# Patient Record
Sex: FEMALE | Race: WHITE | NOT HISPANIC OR LATINO | Employment: UNEMPLOYED | URBAN - NONMETROPOLITAN AREA
[De-identification: names, ages, dates, MRNs, and addresses within clinical notes are randomized per-mention and may not be internally consistent; named-entity substitution may affect disease eponyms.]

---

## 2017-06-11 ENCOUNTER — APPOINTMENT (OUTPATIENT)
Dept: GENERAL RADIOLOGY | Facility: HOSPITAL | Age: 57
End: 2017-06-11

## 2017-06-11 ENCOUNTER — HOSPITAL ENCOUNTER (EMERGENCY)
Facility: HOSPITAL | Age: 57
Discharge: HOME | End: 2017-06-11
Attending: EMERGENCY MEDICINE | Admitting: EMERGENCY MEDICINE

## 2017-06-11 VITALS
HEART RATE: 97 BPM | TEMPERATURE: 98.3 F | BODY MASS INDEX: 28 KG/M2 | OXYGEN SATURATION: 96 % | SYSTOLIC BLOOD PRESSURE: 131 MMHG | RESPIRATION RATE: 26 BRPM | HEIGHT: 63 IN | DIASTOLIC BLOOD PRESSURE: 74 MMHG | WEIGHT: 158 LBS

## 2017-06-11 DIAGNOSIS — J44.1 COPD EXACERBATION (HCC): Primary | ICD-10-CM

## 2017-06-11 LAB
A-A DO2: 41.7 MMHG (ref 0–300)
ALBUMIN SERPL-MCNC: 4.3 G/DL (ref 3.5–5)
ALBUMIN/GLOB SERPL: 1.2 G/DL (ref 1.5–2.5)
ALP SERPL-CCNC: 104 U/L (ref 35–104)
ALT SERPL W P-5'-P-CCNC: 20 U/L (ref 10–36)
ANION GAP SERPL CALCULATED.3IONS-SCNC: 6.6 MMOL/L (ref 3.6–11.2)
ARTERIAL PATENCY WRIST A: ABNORMAL
AST SERPL-CCNC: 20 U/L (ref 10–30)
ATMOSPHERIC PRESS: 731 MMHG
BASE EXCESS BLDA CALC-SCNC: 1 MMOL/L
BASOPHILS # BLD AUTO: 0.08 10*3/MM3 (ref 0–0.3)
BASOPHILS NFR BLD AUTO: 0.6 % (ref 0–2)
BDY SITE: ABNORMAL
BILIRUB SERPL-MCNC: 0.2 MG/DL (ref 0.2–1.8)
BILIRUB UR QL STRIP: NEGATIVE
BNP SERPL-MCNC: 30 PG/ML (ref 0–100)
BODY TEMPERATURE: 98.6 C
BUN BLD-MCNC: 12 MG/DL (ref 7–21)
BUN/CREAT SERPL: 15.8 (ref 7–25)
CALCIUM SPEC-SCNC: 9.7 MG/DL (ref 7.7–10)
CHLORIDE SERPL-SCNC: 108 MMOL/L (ref 99–112)
CLARITY UR: CLEAR
CO2 SERPL-SCNC: 26.4 MMOL/L (ref 24.3–31.9)
COHGB MFR BLD: 3.5 % (ref 0–5)
COLOR UR: YELLOW
CREAT BLD-MCNC: 0.76 MG/DL (ref 0.43–1.29)
D-LACTATE SERPL-SCNC: 1.2 MMOL/L (ref 0.5–2)
DEPRECATED RDW RBC AUTO: 41.7 FL (ref 37–54)
EOSINOPHIL # BLD AUTO: 0.09 10*3/MM3 (ref 0–0.7)
EOSINOPHIL NFR BLD AUTO: 0.7 % (ref 0–5)
ERYTHROCYTE [DISTWIDTH] IN BLOOD BY AUTOMATED COUNT: 12.9 % (ref 11.5–14.5)
FLUAV AG NPH QL: NEGATIVE
FLUBV AG NPH QL IA: NEGATIVE
GFR SERPL CREATININE-BSD FRML MDRD: 78 ML/MIN/1.73
GLOBULIN UR ELPH-MCNC: 3.6 GM/DL
GLUCOSE BLD-MCNC: 118 MG/DL (ref 70–110)
GLUCOSE UR STRIP-MCNC: NEGATIVE MG/DL
HCO3 BLDA-SCNC: 23.3 MMOL/L (ref 22–26)
HCT VFR BLD AUTO: 40 % (ref 37–47)
HCT VFR BLD CALC: 41 % (ref 37–47)
HGB BLD-MCNC: 13.2 G/DL (ref 12–16)
HGB BLDA-MCNC: 13.8 G/DL (ref 12–16)
HGB UR QL STRIP.AUTO: NEGATIVE
HOLD SPECIMEN: NORMAL
HOLD SPECIMEN: NORMAL
HOROWITZ INDEX BLD+IHG-RTO: 21 %
IMM GRANULOCYTES # BLD: 0.04 10*3/MM3 (ref 0–0.03)
IMM GRANULOCYTES NFR BLD: 0.3 % (ref 0–0.5)
KETONES UR QL STRIP: ABNORMAL
LEUKOCYTE ESTERASE UR QL STRIP.AUTO: NEGATIVE
LYMPHOCYTES # BLD AUTO: 3.32 10*3/MM3 (ref 1–3)
LYMPHOCYTES NFR BLD AUTO: 25.2 % (ref 21–51)
MCH RBC QN AUTO: 29.6 PG (ref 27–33)
MCHC RBC AUTO-ENTMCNC: 33 G/DL (ref 33–37)
MCV RBC AUTO: 89.7 FL (ref 80–94)
METHGB BLD QL: 0.2 % (ref 0–3)
MODALITY: ABNORMAL
MONOCYTES # BLD AUTO: 0.78 10*3/MM3 (ref 0.1–0.9)
MONOCYTES NFR BLD AUTO: 5.9 % (ref 0–10)
NEUTROPHILS # BLD AUTO: 8.89 10*3/MM3 (ref 1.4–6.5)
NEUTROPHILS NFR BLD AUTO: 67.3 % (ref 30–70)
NITRITE UR QL STRIP: NEGATIVE
OSMOLALITY SERPL CALC.SUM OF ELEC: 282.1 MOSM/KG (ref 273–305)
OXYHGB MFR BLDV: 91.2 % (ref 85–100)
PCO2 BLDA: 30.6 MM HG (ref 35–45)
PH BLDA: 7.5 PH UNITS (ref 7.35–7.45)
PH UR STRIP.AUTO: 7 [PH] (ref 5–8)
PLATELET # BLD AUTO: 469 10*3/MM3 (ref 130–400)
PMV BLD AUTO: 10.2 FL (ref 6–10)
PO2 BLDA: 65.3 MM HG (ref 80–100)
POTASSIUM BLD-SCNC: 3.4 MMOL/L (ref 3.5–5.3)
PROT SERPL-MCNC: 7.9 G/DL (ref 6–8)
PROT UR QL STRIP: NEGATIVE
RBC # BLD AUTO: 4.46 10*6/MM3 (ref 4.2–5.4)
SAO2 % BLDCOA: 94.7 % (ref 90–100)
SODIUM BLD-SCNC: 141 MMOL/L (ref 135–153)
SP GR UR STRIP: 1.02 (ref 1–1.03)
TROPONIN I SERPL-MCNC: <0.006 NG/ML
UROBILINOGEN UR QL STRIP: ABNORMAL
WBC NRBC COR # BLD: 13.2 10*3/MM3 (ref 4.5–12.5)
WHOLE BLOOD HOLD SPECIMEN: NORMAL
WHOLE BLOOD HOLD SPECIMEN: NORMAL

## 2017-06-11 PROCEDURE — 99284 EMERGENCY DEPT VISIT MOD MDM: CPT

## 2017-06-11 PROCEDURE — 93010 ELECTROCARDIOGRAM REPORT: CPT | Performed by: INTERNAL MEDICINE

## 2017-06-11 PROCEDURE — 87040 BLOOD CULTURE FOR BACTERIA: CPT | Performed by: EMERGENCY MEDICINE

## 2017-06-11 PROCEDURE — 36600 WITHDRAWAL OF ARTERIAL BLOOD: CPT | Performed by: PHYSICIAN ASSISTANT

## 2017-06-11 PROCEDURE — 71010 HC CHEST PA OR AP: CPT

## 2017-06-11 PROCEDURE — 93005 ELECTROCARDIOGRAM TRACING: CPT | Performed by: PHYSICIAN ASSISTANT

## 2017-06-11 PROCEDURE — 83050 HGB METHEMOGLOBIN QUAN: CPT | Performed by: PHYSICIAN ASSISTANT

## 2017-06-11 PROCEDURE — 94799 UNLISTED PULMONARY SVC/PX: CPT

## 2017-06-11 PROCEDURE — 82805 BLOOD GASES W/O2 SATURATION: CPT | Performed by: PHYSICIAN ASSISTANT

## 2017-06-11 PROCEDURE — 85025 COMPLETE CBC W/AUTO DIFF WBC: CPT | Performed by: EMERGENCY MEDICINE

## 2017-06-11 PROCEDURE — 81003 URINALYSIS AUTO W/O SCOPE: CPT | Performed by: EMERGENCY MEDICINE

## 2017-06-11 PROCEDURE — 71010 XR CHEST 1 VW: CPT | Performed by: RADIOLOGY

## 2017-06-11 PROCEDURE — 83880 ASSAY OF NATRIURETIC PEPTIDE: CPT | Performed by: PHYSICIAN ASSISTANT

## 2017-06-11 PROCEDURE — 84484 ASSAY OF TROPONIN QUANT: CPT | Performed by: PHYSICIAN ASSISTANT

## 2017-06-11 PROCEDURE — 83605 ASSAY OF LACTIC ACID: CPT | Performed by: EMERGENCY MEDICINE

## 2017-06-11 PROCEDURE — 87804 INFLUENZA ASSAY W/OPTIC: CPT | Performed by: PHYSICIAN ASSISTANT

## 2017-06-11 PROCEDURE — 82375 ASSAY CARBOXYHB QUANT: CPT | Performed by: PHYSICIAN ASSISTANT

## 2017-06-11 PROCEDURE — 80053 COMPREHEN METABOLIC PANEL: CPT | Performed by: EMERGENCY MEDICINE

## 2017-06-11 PROCEDURE — 94640 AIRWAY INHALATION TREATMENT: CPT

## 2017-06-11 RX ORDER — DOXYCYCLINE 100 MG/1
100 CAPSULE ORAL ONCE
Status: COMPLETED | OUTPATIENT
Start: 2017-06-11 | End: 2017-06-11

## 2017-06-11 RX ORDER — BUDESONIDE AND FORMOTEROL FUMARATE DIHYDRATE 160; 4.5 UG/1; UG/1
2 AEROSOL RESPIRATORY (INHALATION)
COMMUNITY
End: 2017-06-11

## 2017-06-11 RX ORDER — BUDESONIDE AND FORMOTEROL FUMARATE DIHYDRATE 160; 4.5 UG/1; UG/1
2 AEROSOL RESPIRATORY (INHALATION)
Qty: 10 G | Refills: 0 | Status: SHIPPED | OUTPATIENT
Start: 2017-06-11 | End: 2018-11-16 | Stop reason: SDUPTHER

## 2017-06-11 RX ORDER — CYCLOSPORINE 0.5 MG/ML
1 EMULSION OPHTHALMIC 2 TIMES DAILY
COMMUNITY
End: 2022-03-16

## 2017-06-11 RX ORDER — DOXYCYCLINE 100 MG/1
100 CAPSULE ORAL 2 TIMES DAILY
Qty: 20 CAPSULE | Refills: 0 | Status: SHIPPED | OUTPATIENT
Start: 2017-06-11 | End: 2018-11-16 | Stop reason: SDUPTHER

## 2017-06-11 RX ORDER — SODIUM CHLORIDE 0.9 % (FLUSH) 0.9 %
10 SYRINGE (ML) INJECTION AS NEEDED
Status: DISCONTINUED | OUTPATIENT
Start: 2017-06-11 | End: 2017-06-11 | Stop reason: HOSPADM

## 2017-06-11 RX ORDER — IPRATROPIUM BROMIDE AND ALBUTEROL SULFATE 2.5; .5 MG/3ML; MG/3ML
3 SOLUTION RESPIRATORY (INHALATION) ONCE
Status: COMPLETED | OUTPATIENT
Start: 2017-06-11 | End: 2017-06-11

## 2017-06-11 RX ADMIN — IPRATROPIUM BROMIDE AND ALBUTEROL SULFATE 3 ML: .5; 3 SOLUTION RESPIRATORY (INHALATION) at 17:19

## 2017-06-11 RX ADMIN — DOXYCYCLINE 100 MG: 100 CAPSULE ORAL at 19:30

## 2017-06-11 NOTE — DISCHARGE INSTRUCTIONS

## 2017-06-11 NOTE — ED PROVIDER NOTES
Subjective   Patient is a 57 y.o. female presenting with cough.   Cough   Cough characteristics:  Productive  Sputum characteristics:  Green  Severity:  Moderate  Onset quality:  Gradual  Duration:  3 days  Timing:  Constant  Chronicity:  Recurrent  Smoker: yes    Relieved by:  Nothing  Worsened by:  Nothing  Associated symptoms: fever, shortness of breath and wheezing    Associated symptoms: no chest pain        Review of Systems   Constitutional: Positive for fever.   HENT: Negative.    Respiratory: Positive for cough, shortness of breath and wheezing.    Cardiovascular: Negative.  Negative for chest pain.   Gastrointestinal: Negative.  Negative for abdominal pain.   Endocrine: Negative.    Genitourinary: Negative.  Negative for dysuria.   Skin: Negative.    Neurological: Negative.    Psychiatric/Behavioral: Negative.    All other systems reviewed and are negative.      Past Medical History:   Diagnosis Date   • COPD (chronic obstructive pulmonary disease)    • Glaucoma        Allergies   Allergen Reactions   • Penicillins    • Sulfa Antibiotics        Past Surgical History:   Procedure Laterality Date   • CHOLECYSTECTOMY     • EYE SURGERY     • TONSILLECTOMY         History reviewed. No pertinent family history.    Social History     Social History   • Marital status:      Spouse name: N/A   • Number of children: N/A   • Years of education: N/A     Social History Main Topics   • Smoking status: Current Every Day Smoker     Packs/day: 0.50     Types: Cigarettes   • Smokeless tobacco: None   • Alcohol use No   • Drug use: No   • Sexual activity: Defer     Other Topics Concern   • None     Social History Narrative   • None           Objective   Physical Exam   Constitutional: She is oriented to person, place, and time. She appears well-developed and well-nourished. No distress.   HENT:   Head: Normocephalic and atraumatic.   Right Ear: External ear normal.   Left Ear: External ear normal.   Nose: Nose  normal.   Eyes: Conjunctivae and EOM are normal. Pupils are equal, round, and reactive to light.   Neck: Normal range of motion. Neck supple. No JVD present. No tracheal deviation present.   Cardiovascular: Normal rate, regular rhythm and normal heart sounds.    No murmur heard.  Pulmonary/Chest: Effort normal. No respiratory distress. She has wheezes (mild bilateral).   Abdominal: Soft. Bowel sounds are normal. There is no tenderness.   Musculoskeletal: Normal range of motion. She exhibits no edema or deformity.   Neurological: She is alert and oriented to person, place, and time. No cranial nerve deficit.   Skin: Skin is warm and dry. No rash noted. She is not diaphoretic. No erythema. No pallor.   Psychiatric: She has a normal mood and affect. Her behavior is normal. Thought content normal.   Nursing note and vitals reviewed.      Procedures         ED Course  ED Course   Comment By Time   Disposition patient was in no distress.  Her sat was normal.  She has been counseled about the need for smoking cessation along with follow-up with primary care.  She is to return with any worsening. CRAIG Kruger 06/11 1923                  MDM  Number of Diagnoses or Management Options  COPD exacerbation: established and worsening     Amount and/or Complexity of Data Reviewed  Clinical lab tests: ordered and reviewed  Tests in the radiology section of CPT®: ordered and reviewed    Risk of Complications, Morbidity, and/or Mortality  Presenting problems: moderate        Final diagnoses:   COPD exacerbation            CRAIG Kruger  06/11/17 1927

## 2017-06-11 NOTE — ED NOTES
Pt alert and oriented, skin pwd, no respiratory distress. Pt sitting up in bed, fall precautions maintained, monitoring continued.      Letitia Crisostomo RN  06/11/17 1824

## 2017-06-16 ENCOUNTER — TRANSCRIBE ORDERS (OUTPATIENT)
Dept: ADMINISTRATIVE | Facility: HOSPITAL | Age: 57
End: 2017-06-16

## 2017-06-16 DIAGNOSIS — Z12.31 VISIT FOR SCREENING MAMMOGRAM: Primary | ICD-10-CM

## 2017-06-16 LAB
BACTERIA SPEC AEROBE CULT: NORMAL
BACTERIA SPEC AEROBE CULT: NORMAL

## 2017-06-22 ENCOUNTER — HOSPITAL ENCOUNTER (OUTPATIENT)
Dept: MAMMOGRAPHY | Facility: HOSPITAL | Age: 57
Discharge: HOME | End: 2017-06-22
Attending: INTERNAL MEDICINE | Admitting: INTERNAL MEDICINE

## 2017-06-22 DIAGNOSIS — Z12.31 VISIT FOR SCREENING MAMMOGRAM: ICD-10-CM

## 2017-06-22 PROCEDURE — G0202 SCR MAMMO BI INCL CAD: HCPCS

## 2017-06-22 PROCEDURE — 77067 SCR MAMMO BI INCL CAD: CPT | Performed by: RADIOLOGY

## 2017-06-22 PROCEDURE — 77063 BREAST TOMOSYNTHESIS BI: CPT | Performed by: RADIOLOGY

## 2017-06-22 PROCEDURE — 77063 BREAST TOMOSYNTHESIS BI: CPT

## 2018-11-16 ENCOUNTER — OFFICE VISIT (OUTPATIENT)
Dept: RETAIL CLINIC | Facility: CLINIC | Age: 58
End: 2018-11-16

## 2018-11-16 VITALS
WEIGHT: 166.4 LBS | BODY MASS INDEX: 30.62 KG/M2 | OXYGEN SATURATION: 97 % | HEIGHT: 62 IN | RESPIRATION RATE: 18 BRPM | SYSTOLIC BLOOD PRESSURE: 148 MMHG | TEMPERATURE: 98.3 F | HEART RATE: 89 BPM | DIASTOLIC BLOOD PRESSURE: 88 MMHG

## 2018-11-16 DIAGNOSIS — Z76.0 MEDICATION REFILL: ICD-10-CM

## 2018-11-16 DIAGNOSIS — J40 BRONCHITIS: Primary | ICD-10-CM

## 2018-11-16 DIAGNOSIS — J01.00 SUBACUTE MAXILLARY SINUSITIS: ICD-10-CM

## 2018-11-16 DIAGNOSIS — J44.9 CHRONIC OBSTRUCTIVE PULMONARY DISEASE, UNSPECIFIED COPD TYPE (HCC): ICD-10-CM

## 2018-11-16 DIAGNOSIS — R35.0 URINARY FREQUENCY: ICD-10-CM

## 2018-11-16 LAB
BILIRUB BLD-MCNC: NEGATIVE MG/DL
CLARITY, POC: CLEAR
COLOR UR: YELLOW
GLUCOSE UR STRIP-MCNC: NEGATIVE MG/DL
KETONES UR QL: NEGATIVE
LEUKOCYTE EST, POC: NEGATIVE
NITRITE UR-MCNC: NEGATIVE MG/ML
PH UR: 5.5 [PH] (ref 5–8)
PROT UR STRIP-MCNC: NEGATIVE MG/DL
RBC # UR STRIP: NEGATIVE /UL
SP GR UR: 1.02 (ref 1–1.03)
UROBILINOGEN UR QL: NORMAL

## 2018-11-16 PROCEDURE — 81003 URINALYSIS AUTO W/O SCOPE: CPT | Performed by: NURSE PRACTITIONER

## 2018-11-16 PROCEDURE — 99204 OFFICE O/P NEW MOD 45 MIN: CPT | Performed by: NURSE PRACTITIONER

## 2018-11-16 RX ORDER — ALBUTEROL SULFATE 90 UG/1
2 AEROSOL, METERED RESPIRATORY (INHALATION) EVERY 4 HOURS PRN
COMMUNITY
End: 2018-11-16 | Stop reason: SDUPTHER

## 2018-11-16 RX ORDER — ALBUTEROL SULFATE 90 UG/1
2 AEROSOL, METERED RESPIRATORY (INHALATION) EVERY 4 HOURS PRN
Qty: 1 INHALER | Refills: 0 | Status: SHIPPED | OUTPATIENT
Start: 2018-11-16

## 2018-11-16 RX ORDER — IPRATROPIUM BROMIDE AND ALBUTEROL SULFATE 2.5; .5 MG/3ML; MG/3ML
3 SOLUTION RESPIRATORY (INHALATION) EVERY 4 HOURS PRN
COMMUNITY
End: 2022-03-16

## 2018-11-16 RX ORDER — DEXTROMETHORPHAN HYDROBROMIDE AND PROMETHAZINE HYDROCHLORIDE 15; 6.25 MG/5ML; MG/5ML
5 SYRUP ORAL EVERY 6 HOURS PRN
Qty: 100 ML | Refills: 0 | Status: SHIPPED | OUTPATIENT
Start: 2018-11-16 | End: 2018-11-21

## 2018-11-16 RX ORDER — PREDNISONE 10 MG/1
TABLET ORAL
Qty: 20 TABLET | Refills: 0 | Status: SHIPPED | OUTPATIENT
Start: 2018-11-16 | End: 2022-03-16

## 2018-11-16 RX ORDER — GLIMEPIRIDE 2 MG/1
1 TABLET ORAL 2 TIMES DAILY
COMMUNITY
End: 2022-03-16

## 2018-11-16 RX ORDER — DOXYCYCLINE 100 MG/1
100 CAPSULE ORAL EVERY 12 HOURS SCHEDULED
Qty: 20 CAPSULE | Refills: 0 | Status: SHIPPED | OUTPATIENT
Start: 2018-11-16 | End: 2022-03-16

## 2018-11-16 RX ORDER — BUDESONIDE AND FORMOTEROL FUMARATE DIHYDRATE 160; 4.5 UG/1; UG/1
2 AEROSOL RESPIRATORY (INHALATION)
Qty: 1 INHALER | Refills: 0 | Status: SHIPPED | OUTPATIENT
Start: 2018-11-16

## 2018-11-16 NOTE — PROGRESS NOTES
[email or web address]@  Mandi Baldwin is a 58 y.o. female.   Chief Complaint   Patient presents with   • Cough   • Urinary Frequency      Cough   This is a recurrent problem. The problem has been gradually worsening. The cough is productive of brown sputum. Associated symptoms include ear congestion, headaches, nasal congestion, postnasal drip, shortness of breath (mild) and wheezing (at night). Pertinent negatives include no chest pain, chills, ear pain, fever, myalgias, rash, rhinorrhea or sore throat. Nothing aggravates the symptoms. Risk factors for lung disease include smoking/tobacco exposure. She has tried OTC cough suppressant (Mucinex) for the symptoms. The treatment provided no relief. Her past medical history is significant for COPD.   Urinary Frequency    Episode onset: few days ago. The problem occurs intermittently. The problem has been unchanged. The patient is experiencing no pain. There has been no fever. Associated symptoms include frequency. Pertinent negatives include no chills, discharge, flank pain, hematuria, nausea, urgency or vomiting. She has tried nothing for the symptoms.      Presents to [facility] with c/o onset of cough with congestion and mild shortness of breath with exertion for past days. The cough onset greater than a week ago. Has a PMH of COPD. Requests refill on Symbicort and Albuterol.     The following portions of the patient's history were reviewed and updated as appropriate: allergies, current medications, past family history, past medical history, past social history, past surgical history and problem list.    Current Outpatient Medications:   •  albuterol (PROVENTIL HFA;VENTOLIN HFA) 108 (90 Base) MCG/ACT inhaler, Inhale 2 puffs Every 4 (Four) Hours As Needed for Wheezing., Disp: 1 inhaler, Rfl: 0  •  budesonide-formoterol (SYMBICORT) 160-4.5 MCG/ACT inhaler, Inhale 2 puffs 2 (Two) Times a Day., Disp: 1 inhaler, Rfl: 0  •  cycloSPORINE (RESTASIS) 0.05 % ophthalmic emulsion, 1 drop 2  (Two) Times a Day., Disp: , Rfl:   •  ipratropium-albuterol (DUO-NEB) 0.5-2.5 mg/3 ml nebulizer, Take 3 mL by nebulization Every 4 (Four) Hours As Needed for Wheezing., Disp: , Rfl:   •  timolol (TIMOPTIC) 0.25 % ophthalmic solution, 1 drop 2 (Two) Times a Day., Disp: , Rfl:   •  doxycycline (MONODOX) 100 MG capsule, Take 1 capsule by mouth Every 12 (Twelve) Hours., Disp: 20 capsule, Rfl: 0  •  predniSONE (DELTASONE) 10 MG tablet, Take 4 tablet x 2 days; 3 tablets x 2 days; 2 tablets x 2 days; 1 tablet x 2 days, Disp: 20 tablet, Rfl: 0  •  promethazine-dextromethorphan (PROMETHAZINE-DM) 6.25-15 MG/5ML syrup, Take 5 mL by mouth Every 6 (Six) Hours As Needed for Cough for up to 5 days., Disp: 100 mL, Rfl: 0  •  tiotropium (SPIRIVA) 18 MCG per inhalation capsule, Place 1 capsule into inhaler and inhale Daily., Disp: 30 capsule, Rfl: 0    Allergies   Allergen Reactions   • Sulfa Antibiotics Anaphylaxis   • Penicillins Rash     Review of Systems   Constitutional: Positive for fatigue. Negative for activity change, appetite change, chills and fever.   HENT: Positive for congestion, postnasal drip, sinus pressure and sinus pain. Negative for ear pain, rhinorrhea, sore throat and trouble swallowing.    Eyes: Negative for discharge and itching.   Respiratory: Positive for cough, shortness of breath (mild) and wheezing (at night). Negative for choking.    Cardiovascular: Negative for chest pain.   Gastrointestinal: Negative for abdominal pain, diarrhea, nausea and vomiting.   Endocrine: Negative for cold intolerance.   Genitourinary: Positive for frequency. Negative for dysuria, flank pain, hematuria, urgency and vaginal discharge.   Musculoskeletal: Negative for myalgias, neck pain and neck stiffness.   Skin: Negative for color change, pallor and rash.   Allergic/Immunologic: Negative for immunocompromised state.   Neurological: Positive for headaches. Negative for dizziness.   Psychiatric/Behavioral: Positive for sleep  "disturbance (due to cough).     Objective     Visit Vitals  /88   Pulse 89   Temp 98.3 °F (36.8 °C)   Resp 18   Ht 157.5 cm (62\")   Wt 75.5 kg (166 lb 6.4 oz)   SpO2 97%   BMI 30.43 kg/m²       Physical Exam   Constitutional: She is oriented to person, place, and time. She appears well-developed and well-nourished.   HENT:   Head: Normocephalic.   Right Ear: Tympanic membrane is bulging. Tympanic membrane is not perforated and not erythematous.   Left Ear: Tympanic membrane is injected and bulging. Tympanic membrane is not perforated and not erythematous.   Nose: Mucosal edema and rhinorrhea present. Right sinus exhibits frontal sinus tenderness. Left sinus exhibits frontal sinus tenderness.   Mouth/Throat: Mucous membranes are normal. No uvula swelling. No posterior oropharyngeal erythema or tonsillar abscesses.   Thick purulent PND   Eyes: Conjunctivae are normal. Pupils are equal, round, and reactive to light.   Neck: No JVD present.   Cardiovascular: Normal rate and regular rhythm.   Pulmonary/Chest: Effort normal. She has no decreased breath sounds. She has wheezes. She has no rhonchi. She has no rales.   Abdominal: Soft. Normal appearance and bowel sounds are normal. There is no tenderness. There is no CVA tenderness.   Musculoskeletal: Normal range of motion.   Lymphadenopathy:     She has no cervical adenopathy.   Neurological: She is alert and oriented to person, place, and time.   Skin: Skin is warm, dry and intact.   Psychiatric: She has a normal mood and affect. Her speech is normal and behavior is normal.     Lab Results (last 24 hours)     Procedure Component Value Units Date/Time    POC Urinalysis Dipstick, Automated [[id number]]  (Normal) Collected:  11/16/18 1153    Specimen:  Urine Updated:  11/16/18 1154     Color Yellow     Clarity, UA Clear     Specific Gravity  1.025     pH, Urine 5.5     Leukocytes Negative     Nitrite, UA Negative     Protein, POC Negative mg/dL      Glucose, UA Negative " mg/dL      Ketones, UA Negative     Urobilinogen, UA Normal     Bilirubin Negative     Blood, UA Negative        Assessment/Plan   Mandi was seen today for cough and urinary frequency.    Diagnoses and all orders for this visit:    Bronchitis  -     albuterol (PROVENTIL HFA;VENTOLIN HFA) 108 (90 Base) MCG/ACT inhaler; Inhale 2 puffs Every 4 (Four) Hours As Needed for Wheezing.  -     promethazine-dextromethorphan (PROMETHAZINE-DM) 6.25-15 MG/5ML syrup; Take 5 mL by mouth Every 6 (Six) Hours As Needed for Cough for up to 5 days.  -     predniSONE (DELTASONE) 10 MG tablet; Take 4 tablet x 2 days; 3 tablets x 2 days; 2 tablets x 2 days; 1 tablet x 2 days    Urinary frequency  -     POC Urinalysis Dipstick, Automated    Subacute maxillary sinusitis  -     doxycycline (MONODOX) 100 MG capsule; Take 1 capsule by mouth Every 12 (Twelve) Hours.    Chronic obstructive pulmonary disease, unspecified COPD type (CMS/HCC)  -     budesonide-formoterol (SYMBICORT) 160-4.5 MCG/ACT inhaler; Inhale 2 puffs 2 (Two) Times a Day.  -     doxycycline (MONODOX) 100 MG capsule; Take 1 capsule by mouth Every 12 (Twelve) Hours.    Medication refill               Discussed results of the UA, negative and comfort measures. Medication refills provided. Instructed on PE findings and treatment plan.  AVS reviewed with patient, understanding verbalized and agrees with treatment plan.  Follow up with primary care provider if no improvement within next 7-10 days. Go to [facility] or ER if condition worsens.

## 2018-11-16 NOTE — PATIENT INSTRUCTIONS
Urinary Frequency, Adult  Urinary frequency means urinating more often than usual. People with urinary frequency urinate at least 8 times in 24 hours, even if they drink a normal amount of fluid. Although they urinate more often than normal, the total amount of urine produced in a day may be normal. Urinary frequency is also called pollakiuria.  What are the causes?  This condition may be caused by:  · A urinary tract infection.  · Obesity.  · Bladder problems, such as bladder stones.  · Caffeine or alcohol.  · Eating food or drinking fluids that irritate the bladder. These include coffee, tea, soda, artificial sweeteners, citrus, tomato-based foods, and chocolate.  · Certain medicines, such as medicines that help the body get rid of extra fluid (diuretics).  · Muscle or nerve weakness.  · Overactive bladder.  · Chronic diabetes.  · Interstitial cystitis.  · In men, problems with the prostate, such as an enlarged prostate.  · In women, pregnancy.    In some cases, the cause may not be known.  What increases the risk?  This condition is more likely to develop in:  · Women who have gone through menopause.  · Men with prostate problems.  · People with a disease or injury that affects the nerves or spinal cord.  · People who have or have had a condition that affects the brain, such as a stroke.    What are the signs or symptoms?  Symptoms of this condition include:  · Feeling an urgent need to urinate often. The stress and anxiety of needing to find a bathroom quickly can make this urge worse.  · Urinating 8 or more times in 24 hours.  · Urinating as often as every 1 to 2 hours.    How is this diagnosed?  This condition is diagnosed based on your symptoms, your medical history, and a physical exam. You may have tests, such as:  · Blood tests.  · Urine tests.  · Imaging tests, such as X-rays or ultrasounds.  · A bladder test.  · A test of your neurological system. This is the body system that senses the need to  urinate.  · A test to check for problems in the urethra and bladder called cystoscopy.    You may also be asked to keep a bladder diary. A bladder diary is a record of what you eat and drink, how often you urinate, and how much you urinate. You may need to see a health care provider who specializes in conditions of the urinary tract (urologist) or kidneys (nephrologist).  How is this treated?  Treatment for this condition depends on the cause. Sometimes the condition goes away on its own and treatment is not necessary. If treatment is needed, it may include:  · Taking medicine.  · Learning exercises that strengthen the muscles that help control urination.  · Following a bladder training program. This may include:  ? Learning to delay going to the bathroom.  ? Double urinating (voiding). This helps if you are not completely emptying your bladder.  ? Scheduled voiding.  · Making diet changes, such as:  ? Avoiding caffeine.  ? Drinking fewer fluids, especially alcohol.  ? Not drinking in the evening.  ? Not having foods or drinks that may irritate the bladder.  ? Eating foods that help prevent or ease constipation. Constipation can make this condition worse.  · Having the nerves in your bladder stimulated. There are two options for stimulating the nerves to your bladder:  ? Outpatient electrical nerve stimulation. This is done by your health care provider.  ? Surgery to implant a bladder pacemaker. The pacemaker helps to control the urge to urinate.    Follow these instructions at home:  · Keep a bladder diary if told to by your health care provider.  · Take over-the-counter and prescription medicines only as told by your health care provider.  · Do any exercises as told by your health care provider.  · Follow a bladder training program as told by your health care provider.  · Make any recommended diet changes.  · Keep all follow-up visits as told by your health care provider. This is important.  Contact a health care  provider if:  · You start urinating more often.  · You feel pain or irritation when you urinate.  · You notice blood in your urine.  · Your urine looks cloudy.  · You develop a fever.  · You begin vomiting.  Get help right away if:  · You are unable to urinate.  This information is not intended to replace advice given to you by your health care provider. Make sure you discuss any questions you have with your health care provider.  Document Released: 10/14/2010 Document Revised: 01/18/2017 Document Reviewed: 07/13/2016  [employer] Interactive Patient Education © 2018 [employer]    Cough, Adult  A cough helps to clear your throat and lungs. A cough may last only 2-3 weeks (acute), or it may last longer than 8 weeks (chronic). Many different things can cause a cough. A cough may be a sign of an illness or another medical condition.  Follow these instructions at home:  · Pay attention to any changes in your cough.  · Take medicines only as told by your doctor.  ? If you were prescribed an antibiotic medicine, take it as told by your doctor. Do not stop taking it even if you start to feel better.  ? Talk with your doctor before you try using a cough medicine.  · Drink enough fluid to keep your pee (urine) clear or pale yellow.  · If the air is dry, use a cold steam vaporizer or humidifier in your home.  · Stay away from things that make you cough at work or at home.  · If your cough is worse at night, try using extra pillows to raise your head up higher while you sleep.  · Do not smoke, and try not to be around smoke. If you need help quitting, ask your doctor.  · Do not have caffeine.  · Do not drink alcohol.  · Rest as needed.  Contact a doctor if:  · You have new problems (symptoms).  · You cough up yellow fluid (pus).  · Your cough does not get better after 2-3 weeks, or your cough gets worse.  · Medicine does not help your cough and you are not sleeping well.  · You have pain that gets worse or pain that is not  helped with medicine.  · You have a fever.  · You are losing weight and you do not know why.  · You have night sweats.  Get help right away if:  · You cough up blood.  · You have trouble breathing.  · Your heartbeat is very fast.  This information is not intended to replace advice given to you by your health care provider. Make sure you discuss any questions you have with your health care provider.  Document Released: 08/30/2012 Document Revised: 05/25/2017 Document Reviewed: 02/24/2016  [employer] Interactive Patient Education © 2018 [employer]    Acute Bronchitis, Adult  Acute bronchitis is when air tubes (bronchi) in the lungs suddenly get swollen. The condition can make it hard to breathe. It can also cause these symptoms:  · A cough.  · Coughing up clear, yellow, or green mucus.  · Wheezing.  · Chest congestion.  · Shortness of breath.  · A fever.  · Body aches.  · Chills.  · A sore throat.    Follow these instructions at home:  Medicines  · Take over-the-counter and prescription medicines only as told by your doctor.  · If you were prescribed an antibiotic medicine, take it as told by your doctor. Do not stop taking the antibiotic even if you start to feel better.  General instructions  · Rest.  · Drink enough fluids to keep your pee (urine) clear or pale yellow.  · Avoid smoking and secondhand smoke. If you smoke and you need help quitting, ask your doctor. Quitting will help your lungs heal faster.  · Use an inhaler, cool mist vaporizer, or humidifier as told by your doctor.  · Keep all follow-up visits as told by your doctor. This is important.  How is this prevented?  To lower your risk of getting this condition again:  · Wash your hands often with soap and water. If you cannot use soap and water, use hand .  · Avoid contact with people who have cold symptoms.  · Try not to touch your hands to your mouth, nose, or eyes.  · Make sure to get the flu shot every year.    Contact a doctor if:  · Your  symptoms do not get better in 2 weeks.  Get help right away if:  · You cough up blood.  · You have chest pain.  · You have very bad shortness of breath.  · You become dehydrated.  · You faint (pass out) or keep feeling like you are going to pass out.  · You keep throwing up (vomiting).  · You have a very bad headache.  · Your fever or chills gets worse.  This information is not intended to replace advice given to you by your health care provider. Make sure you discuss any questions you have with your health care provider.  Document Released: 06/05/2009 Document Revised: 07/26/2017 Document Reviewed: 06/07/2017  [employer] Interactive Patient Education © 2018 [employer]    Sinusitis, Adult  Sinusitis is soreness and inflammation of your sinuses. Sinuses are hollow spaces in the bones around your face. They are located:  · Around your eyes.  · In the middle of your forehead.  · Behind your nose.  · In your cheekbones.    Your sinuses and nasal passages are lined with a stringy fluid (mucus). Mucus normally drains out of your sinuses. When your nasal tissues get inflamed or swollen, the mucus can get trapped or blocked so air cannot flow through your sinuses. This lets bacteria, viruses, and funguses grow, and that leads to infection.  Follow these instructions at home:  Medicines  · Take, use, or apply over-the-counter and prescription medicines only as told by your doctor. These may include nasal sprays.  · If you were prescribed an antibiotic medicine, take it as told by your doctor. Do not stop taking the antibiotic even if you start to feel better.  Hydrate and Humidify  · Drink enough water to keep your pee (urine) clear or pale yellow.  · Use a cool mist humidifier to keep the humidity level in your home above 50%.  · Breathe in steam for 10-15 minutes, 3-4 times a day or as told by your doctor. You can do this in the bathroom while a hot shower is running.  · Try not to spend time in cool or dry  air.  Rest  · Rest as much as possible.  · Sleep with your head raised (elevated).  · Make sure to get enough sleep each night.  General instructions  · Put a warm, moist washcloth on your face 3-4 times a day or as told by your doctor. This will help with discomfort.  · Wash your hands often with soap and water. If there is no soap and water, use hand .  · Do not smoke. Avoid being around people who are smoking (secondhand smoke).  · Keep all follow-up visits as told by your doctor. This is important.  Contact a doctor if:  · You have a fever.  · Your symptoms get worse.  · Your symptoms do not get better within 10 days.  Get help right away if:  · You have a very bad headache.  · You cannot stop throwing up (vomiting).  · You have pain or swelling around your face or eyes.  · You have trouble seeing.  · You feel confused.  · Your neck is stiff.  · You have trouble breathing.  This information is not intended to replace advice given to you by your health care provider. Make sure you discuss any questions you have with your health care provider.  Document Released: 06/05/2009 Document Revised: 08/13/2017 Document Reviewed: 10/12/2016  [employer] Interactive Patient Education © 2018 [employer]

## 2019-08-27 ENCOUNTER — LAB REQUISITION (OUTPATIENT)
Dept: LAB | Facility: HOSPITAL | Age: 59
End: 2019-08-27

## 2019-08-27 DIAGNOSIS — J20.8 ACUTE BRONCHITIS DUE TO OTHER SPECIFIED ORGANISMS: ICD-10-CM

## 2019-08-27 DIAGNOSIS — R00.2 PALPITATIONS: ICD-10-CM

## 2019-08-27 DIAGNOSIS — E78.1 PURE HYPERGLYCERIDEMIA: ICD-10-CM

## 2019-08-27 DIAGNOSIS — J44.9 CHRONIC OBSTRUCTIVE PULMONARY DISEASE (HCC): ICD-10-CM

## 2019-08-27 DIAGNOSIS — I10 ESSENTIAL (PRIMARY) HYPERTENSION: ICD-10-CM

## 2019-08-27 LAB
ALBUMIN SERPL-MCNC: 4.52 G/DL (ref 3.5–5.2)
ALBUMIN/GLOB SERPL: 1.8 G/DL
ALP SERPL-CCNC: 89 U/L (ref 39–117)
ALT SERPL W P-5'-P-CCNC: 13 U/L (ref 1–33)
ANION GAP SERPL CALCULATED.3IONS-SCNC: 14.1 MMOL/L (ref 5–15)
AST SERPL-CCNC: 12 U/L (ref 1–32)
BASOPHILS # BLD AUTO: 0.04 10*3/MM3 (ref 0–0.2)
BASOPHILS NFR BLD AUTO: 0.3 % (ref 0–1.5)
BILIRUB SERPL-MCNC: 0.2 MG/DL (ref 0.2–1.2)
BUN BLD-MCNC: 13 MG/DL (ref 8–23)
BUN/CREAT SERPL: 19.1 (ref 7–25)
CALCIUM SPEC-SCNC: 9.4 MG/DL (ref 8.6–10.5)
CHLORIDE SERPL-SCNC: 98 MMOL/L (ref 98–107)
CHOLEST SERPL-MCNC: 184 MG/DL (ref 0–200)
CO2 SERPL-SCNC: 23.9 MMOL/L (ref 22–29)
CREAT BLD-MCNC: 0.68 MG/DL (ref 0.57–1)
DEPRECATED RDW RBC AUTO: 43.8 FL (ref 37–54)
EOSINOPHIL # BLD AUTO: 0.07 10*3/MM3 (ref 0–0.4)
EOSINOPHIL NFR BLD AUTO: 0.5 % (ref 0.3–6.2)
ERYTHROCYTE [DISTWIDTH] IN BLOOD BY AUTOMATED COUNT: 13.5 % (ref 12.3–15.4)
FOLATE SERPL-MCNC: 12.5 NG/ML (ref 4.78–24.2)
GFR SERPL CREATININE-BSD FRML MDRD: 88 ML/MIN/1.73
GLOBULIN UR ELPH-MCNC: 2.5 GM/DL
GLUCOSE BLD-MCNC: 142 MG/DL (ref 65–99)
HBA1C MFR BLD: 6.4 % (ref 4.8–5.6)
HCT VFR BLD AUTO: 45.5 % (ref 34–46.6)
HDLC SERPL-MCNC: 57 MG/DL (ref 40–60)
HGB BLD-MCNC: 14.9 G/DL (ref 12–15.9)
IMM GRANULOCYTES # BLD AUTO: 0.05 10*3/MM3 (ref 0–0.05)
IMM GRANULOCYTES NFR BLD AUTO: 0.3 % (ref 0–0.5)
LDLC SERPL CALC-MCNC: 90 MG/DL (ref 0–100)
LDLC/HDLC SERPL: 1.58 {RATIO}
LYMPHOCYTES # BLD AUTO: 4.03 10*3/MM3 (ref 0.7–3.1)
LYMPHOCYTES NFR BLD AUTO: 28 % (ref 19.6–45.3)
MCH RBC QN AUTO: 29.4 PG (ref 26.6–33)
MCHC RBC AUTO-ENTMCNC: 32.7 G/DL (ref 31.5–35.7)
MCV RBC AUTO: 89.9 FL (ref 79–97)
MONOCYTES # BLD AUTO: 0.62 10*3/MM3 (ref 0.1–0.9)
MONOCYTES NFR BLD AUTO: 4.3 % (ref 5–12)
NEUTROPHILS # BLD AUTO: 9.6 10*3/MM3 (ref 1.7–7)
NEUTROPHILS NFR BLD AUTO: 66.6 % (ref 42.7–76)
PLATELET # BLD AUTO: 356 10*3/MM3 (ref 140–450)
PMV BLD AUTO: 10.8 FL (ref 6–12)
POTASSIUM BLD-SCNC: 4.7 MMOL/L (ref 3.5–5.2)
PROT SERPL-MCNC: 7 G/DL (ref 6–8.5)
RBC # BLD AUTO: 5.06 10*6/MM3 (ref 3.77–5.28)
SODIUM BLD-SCNC: 136 MMOL/L (ref 136–145)
TRIGL SERPL-MCNC: 186 MG/DL (ref 0–150)
TSH SERPL DL<=0.05 MIU/L-ACNC: 1.3 MIU/ML (ref 0.27–4.2)
VIT B12 BLD-MCNC: 641 PG/ML (ref 211–946)
VLDLC SERPL-MCNC: 37.2 MG/DL
WBC NRBC COR # BLD: 14.41 10*3/MM3 (ref 3.4–10.8)

## 2019-08-27 PROCEDURE — 83036 HEMOGLOBIN GLYCOSYLATED A1C: CPT | Performed by: INTERNAL MEDICINE

## 2019-08-27 PROCEDURE — 85025 COMPLETE CBC W/AUTO DIFF WBC: CPT | Performed by: INTERNAL MEDICINE

## 2019-08-27 PROCEDURE — 80053 COMPREHEN METABOLIC PANEL: CPT | Performed by: INTERNAL MEDICINE

## 2019-08-27 PROCEDURE — 80061 LIPID PANEL: CPT | Performed by: INTERNAL MEDICINE

## 2019-08-27 PROCEDURE — 82746 ASSAY OF FOLIC ACID SERUM: CPT | Performed by: INTERNAL MEDICINE

## 2019-08-27 PROCEDURE — 82607 VITAMIN B-12: CPT | Performed by: INTERNAL MEDICINE

## 2019-08-27 PROCEDURE — 84443 ASSAY THYROID STIM HORMONE: CPT | Performed by: INTERNAL MEDICINE

## 2019-10-03 ENCOUNTER — TRANSCRIBE ORDERS (OUTPATIENT)
Dept: ADMINISTRATIVE | Facility: HOSPITAL | Age: 59
End: 2019-10-03

## 2019-10-03 DIAGNOSIS — E04.9 ENLARGED THYROID: Primary | ICD-10-CM

## 2019-10-04 ENCOUNTER — HOSPITAL ENCOUNTER (OUTPATIENT)
Dept: BONE DENSITY | Facility: HOSPITAL | Age: 59
Discharge: HOME | End: 2019-10-04
Admitting: INTERNAL MEDICINE

## 2019-10-04 DIAGNOSIS — M81.0 POST-MENOPAUSAL OSTEOPOROSIS: ICD-10-CM

## 2019-10-04 PROCEDURE — 77080 DXA BONE DENSITY AXIAL: CPT

## 2019-10-04 PROCEDURE — 77080 DXA BONE DENSITY AXIAL: CPT | Performed by: RADIOLOGY

## 2019-10-18 ENCOUNTER — HOSPITAL ENCOUNTER (OUTPATIENT)
Dept: ULTRASOUND IMAGING | Facility: HOSPITAL | Age: 59
Discharge: HOME | End: 2019-10-18
Admitting: INTERNAL MEDICINE

## 2019-10-18 DIAGNOSIS — E04.9 ENLARGED THYROID: ICD-10-CM

## 2019-10-18 PROCEDURE — 76536 US EXAM OF HEAD AND NECK: CPT

## 2019-10-18 PROCEDURE — 76536 US EXAM OF HEAD AND NECK: CPT | Performed by: RADIOLOGY

## 2020-04-15 ENCOUNTER — HOSPITAL ENCOUNTER (EMERGENCY)
Facility: HOSPITAL | Age: 60
Discharge: HOME | End: 2020-04-15
Attending: FAMILY MEDICINE | Admitting: FAMILY MEDICINE

## 2020-04-15 VITALS
HEART RATE: 84 BPM | TEMPERATURE: 97.5 F | DIASTOLIC BLOOD PRESSURE: 96 MMHG | SYSTOLIC BLOOD PRESSURE: 169 MMHG | OXYGEN SATURATION: 98 % | RESPIRATION RATE: 20 BRPM | BODY MASS INDEX: 30.12 KG/M2 | WEIGHT: 170 LBS | HEIGHT: 63 IN

## 2020-04-15 DIAGNOSIS — Z77.21 EXPOSURE TO BLOOD OR BODY FLUID: Primary | ICD-10-CM

## 2020-04-15 LAB
ALT SERPL W P-5'-P-CCNC: 12 U/L (ref 1–33)
HBV SURFACE AG SERPL QL IA: NORMAL
HCV AB SER DONR QL: NORMAL
HIV1+2 AB SER QL: NORMAL

## 2020-04-15 PROCEDURE — 87340 HEPATITIS B SURFACE AG IA: CPT | Performed by: PHYSICIAN ASSISTANT

## 2020-04-15 PROCEDURE — 86803 HEPATITIS C AB TEST: CPT | Performed by: PHYSICIAN ASSISTANT

## 2020-04-15 PROCEDURE — 90715 TDAP VACCINE 7 YRS/> IM: CPT | Performed by: PHYSICIAN ASSISTANT

## 2020-04-15 PROCEDURE — 84460 ALANINE AMINO (ALT) (SGPT): CPT | Performed by: PHYSICIAN ASSISTANT

## 2020-04-15 PROCEDURE — 25010000002 TDAP 5-2.5-18.5 LF-MCG/0.5 SUSPENSION: Performed by: PHYSICIAN ASSISTANT

## 2020-04-15 PROCEDURE — G0432 EIA HIV-1/HIV-2 SCREEN: HCPCS | Performed by: PHYSICIAN ASSISTANT

## 2020-04-15 PROCEDURE — 86706 HEP B SURFACE ANTIBODY: CPT | Performed by: PHYSICIAN ASSISTANT

## 2020-04-15 PROCEDURE — 36415 COLL VENOUS BLD VENIPUNCTURE: CPT

## 2020-04-15 PROCEDURE — 99283 EMERGENCY DEPT VISIT LOW MDM: CPT

## 2020-04-15 PROCEDURE — 90471 IMMUNIZATION ADMIN: CPT | Performed by: PHYSICIAN ASSISTANT

## 2020-04-15 RX ADMIN — TETANUS TOXOID, REDUCED DIPHTHERIA TOXOID AND ACELLULAR PERTUSSIS VACCINE, ADSORBED 0.5 ML: 5; 2.5; 8; 8; 2.5 SUSPENSION INTRAMUSCULAR at 20:10

## 2020-04-15 NOTE — ED PROVIDER NOTES
Subjective   Works as a nurse at [employer].Pt got blood in eye after flushing a wound vac   States that she called Dr Garcia and he is going to call her antibiotics for her eye and will see her Friday for follow-up   She flushed her eye copiously with normal saline pta   No eye pain or change in vision      History provided by:  Patient   used: No    Body Fluid Exposure   Type of exposure:  Body fluid  Exposure substance: blood    Context:  Patient care  Patient immunocompromised: no    Known source: no    STD concern: no        Review of Systems   Constitutional: Negative for chills and fever.   HENT: Negative for congestion, ear pain and sore throat.    Respiratory: Negative for cough, shortness of breath and wheezing.    Cardiovascular: Negative for chest pain.   Gastrointestinal: Negative for diarrhea, nausea and vomiting.   Genitourinary: Negative for dysuria and flank pain.   Skin: Negative for rash.   Neurological: Negative for headaches.   Psychiatric/Behavioral: The patient is not nervous/anxious.    All other systems reviewed and are negative.      Past Medical History:   Diagnosis Date   • COPD (chronic obstructive pulmonary disease) (CMS/[facility])    • Glaucoma        Allergies   Allergen Reactions   • Sulfa Antibiotics Anaphylaxis   • Penicillins Rash       Past Surgical History:   Procedure Laterality Date   • AUGMENTATION MAMMAPLASTY  2007   • CHOLECYSTECTOMY     • EYE SURGERY     • TONSILLECTOMY         Family History   Problem Relation Age of Onset   • Breast cancer Paternal Aunt    • Lung cancer Father        Social History     Socioeconomic History   • Marital status: Single     Spouse name: Not on file   • Number of children: Not on file   • Years of education: Not on file   • Highest education level: Not on file   Tobacco Use   • Smoking status: Current Every Day Smoker     Packs/day: 0.50     Types: Cigarettes   Substance and Sexual Activity   • Alcohol use: No    • Drug use: No   • Sexual activity: Defer           Objective   Physical Exam   Constitutional: She is oriented to person, place, and time. She appears well-developed and well-nourished.   HENT:   Head: Normocephalic.   Mouth/Throat: Oropharynx is clear and moist.   Eyes: Conjunctivae and lids are normal. Right eye exhibits no chemosis, no discharge, no exudate and no hordeolum. No foreign body present in the right eye. Right conjunctiva is not injected. Right conjunctiva has no hemorrhage. Right eye exhibits normal extraocular motion and no nystagmus. Right pupil is round and reactive. Pupils are equal.   Neck: Neck supple.   Cardiovascular: Normal rate and regular rhythm.   Pulmonary/Chest: Effort normal and breath sounds normal.   Abdominal: Soft. Bowel sounds are normal. There is no tenderness.   Musculoskeletal: Normal range of motion.   Neurological: She is alert and oriented to person, place, and time.   Skin: Skin is warm and dry.   Psychiatric: She has a normal mood and affect. Her behavior is normal. Judgment and thought content normal.   Nursing note and vitals reviewed.      Procedures           ED Course                                           MDM    Final diagnoses:   Exposure to blood or body fluid            Cindy Ralph PA  04/17/20 0952

## 2020-04-16 LAB
HBV SURFACE AB SER RIA-ACNC: REACTIVE
HOLD SPECIMEN: NORMAL

## 2020-04-16 NOTE — ED NOTES
Introduced myself to pt at this time. Pt is resting comfortably on ED stretcher. Pt is updated on plan of care and timeframe for results. Pt respirations even and unlabored, NADN. Call light in reach, will continue to monitor      Anderson Traore RN  04/15/20 0794

## 2020-08-27 ENCOUNTER — LAB (OUTPATIENT)
Dept: LAB | Facility: HOSPITAL | Age: 60
End: 2020-08-27

## 2020-08-27 ENCOUNTER — TRANSCRIBE ORDERS (OUTPATIENT)
Dept: ADMINISTRATIVE | Facility: HOSPITAL | Age: 60
End: 2020-08-27

## 2020-08-27 DIAGNOSIS — Z20.828 EXPOSURE TO SARS-ASSOCIATED CORONAVIRUS: Primary | ICD-10-CM

## 2020-08-27 DIAGNOSIS — Z20.828 EXPOSURE TO SARS-ASSOCIATED CORONAVIRUS: ICD-10-CM

## 2020-08-27 PROCEDURE — U0004 COV-19 TEST NON-CDC HGH THRU: HCPCS

## 2020-08-27 PROCEDURE — U0002 COVID-19 LAB TEST NON-CDC: HCPCS

## 2020-08-27 PROCEDURE — C9803 HOPD COVID-19 SPEC COLLECT: HCPCS

## 2020-08-28 LAB
REF LAB TEST METHOD: NORMAL
SARS-COV-2 RNA RESP QL NAA+PROBE: NOT DETECTED

## 2020-12-19 ENCOUNTER — IMMUNIZATION (OUTPATIENT)
Dept: VACCINE CLINIC | Facility: HOSPITAL | Age: 60
End: 2020-12-19

## 2020-12-19 PROCEDURE — 0001A: CPT | Performed by: FAMILY MEDICINE

## 2020-12-19 PROCEDURE — 91300 HC SARSCOV02 VAC 30MCG/0.3ML IM: CPT | Performed by: FAMILY MEDICINE

## 2021-01-09 ENCOUNTER — IMMUNIZATION (OUTPATIENT)
Dept: VACCINE CLINIC | Facility: HOSPITAL | Age: 61
End: 2021-01-09

## 2021-01-09 PROCEDURE — 91300 HC SARSCOV02 VAC 30MCG/0.3ML IM: CPT | Performed by: FAMILY MEDICINE

## 2021-01-09 PROCEDURE — 0002A: CPT | Performed by: FAMILY MEDICINE

## 2021-01-09 PROCEDURE — 0001A: CPT | Performed by: FAMILY MEDICINE

## 2021-09-08 ENCOUNTER — LAB (OUTPATIENT)
Dept: LAB | Facility: HOSPITAL | Age: 61
End: 2021-09-08

## 2021-09-08 ENCOUNTER — TRANSCRIBE ORDERS (OUTPATIENT)
Dept: ADMINISTRATIVE | Facility: HOSPITAL | Age: 61
End: 2021-09-08

## 2021-09-08 DIAGNOSIS — Z11.52 ENCOUNTER FOR SCREENING FOR COVID-19: Primary | ICD-10-CM

## 2021-09-08 DIAGNOSIS — Z11.52 ENCOUNTER FOR SCREENING FOR COVID-19: ICD-10-CM

## 2021-09-08 PROCEDURE — C9803 HOPD COVID-19 SPEC COLLECT: HCPCS

## 2021-09-08 PROCEDURE — U0004 COV-19 TEST NON-CDC HGH THRU: HCPCS | Performed by: INTERNAL MEDICINE

## 2021-09-09 LAB — SARS-COV-2 RNA NOSE QL NAA+PROBE: NOT DETECTED

## 2021-09-27 ENCOUNTER — IMMUNIZATION (OUTPATIENT)
Dept: VACCINE CLINIC | Facility: HOSPITAL | Age: 61
End: 2021-09-27

## 2021-09-27 PROCEDURE — 91300 HC SARSCOV02 VAC 30MCG/0.3ML IM: CPT | Performed by: INTERNAL MEDICINE

## 2021-09-27 PROCEDURE — 0003A: CPT | Performed by: INTERNAL MEDICINE

## 2021-11-01 ENCOUNTER — LAB REQUISITION (OUTPATIENT)
Dept: LAB | Facility: HOSPITAL | Age: 61
End: 2021-11-01

## 2021-11-01 DIAGNOSIS — Z01.818 ENCOUNTER FOR OTHER PREPROCEDURAL EXAMINATION: ICD-10-CM

## 2021-11-01 LAB
ANION GAP SERPL CALCULATED.3IONS-SCNC: 7.6 MMOL/L (ref 5–15)
BASOPHILS # BLD AUTO: 0.07 10*3/MM3 (ref 0–0.2)
BASOPHILS NFR BLD AUTO: 0.7 % (ref 0–1.5)
BUN SERPL-MCNC: 9 MG/DL (ref 8–23)
BUN/CREAT SERPL: 12.9 (ref 7–25)
CALCIUM SPEC-SCNC: 9.4 MG/DL (ref 8.6–10.5)
CHLORIDE SERPL-SCNC: 101 MMOL/L (ref 98–107)
CO2 SERPL-SCNC: 27.4 MMOL/L (ref 22–29)
CREAT SERPL-MCNC: 0.7 MG/DL (ref 0.57–1)
DEPRECATED RDW RBC AUTO: 44.3 FL (ref 37–54)
EOSINOPHIL # BLD AUTO: 0.29 10*3/MM3 (ref 0–0.4)
EOSINOPHIL NFR BLD AUTO: 2.7 % (ref 0.3–6.2)
ERYTHROCYTE [DISTWIDTH] IN BLOOD BY AUTOMATED COUNT: 13.2 % (ref 12.3–15.4)
GFR SERPL CREATININE-BSD FRML MDRD: 85 ML/MIN/1.73
GLUCOSE SERPL-MCNC: 99 MG/DL (ref 65–99)
HCT VFR BLD AUTO: 46.8 % (ref 34–46.6)
HGB BLD-MCNC: 14.9 G/DL (ref 12–15.9)
IMM GRANULOCYTES # BLD AUTO: 0.03 10*3/MM3 (ref 0–0.05)
IMM GRANULOCYTES NFR BLD AUTO: 0.3 % (ref 0–0.5)
LYMPHOCYTES # BLD AUTO: 3.13 10*3/MM3 (ref 0.7–3.1)
LYMPHOCYTES NFR BLD AUTO: 29.3 % (ref 19.6–45.3)
MCH RBC QN AUTO: 29 PG (ref 26.6–33)
MCHC RBC AUTO-ENTMCNC: 31.8 G/DL (ref 31.5–35.7)
MCV RBC AUTO: 91.1 FL (ref 79–97)
MONOCYTES # BLD AUTO: 0.58 10*3/MM3 (ref 0.1–0.9)
MONOCYTES NFR BLD AUTO: 5.4 % (ref 5–12)
NEUTROPHILS NFR BLD AUTO: 6.57 10*3/MM3 (ref 1.7–7)
NEUTROPHILS NFR BLD AUTO: 61.6 % (ref 42.7–76)
NRBC BLD AUTO-RTO: 0 /100 WBC (ref 0–0.2)
PLATELET # BLD AUTO: 376 10*3/MM3 (ref 140–450)
PMV BLD AUTO: 10.7 FL (ref 6–12)
POTASSIUM SERPL-SCNC: 5 MMOL/L (ref 3.5–5.2)
RBC # BLD AUTO: 5.14 10*6/MM3 (ref 3.77–5.28)
SODIUM SERPL-SCNC: 136 MMOL/L (ref 136–145)
WBC # BLD AUTO: 10.67 10*3/MM3 (ref 3.4–10.8)

## 2021-11-01 PROCEDURE — 85025 COMPLETE CBC W/AUTO DIFF WBC: CPT | Performed by: INTERNAL MEDICINE

## 2021-11-01 PROCEDURE — 80048 BASIC METABOLIC PNL TOTAL CA: CPT | Performed by: INTERNAL MEDICINE

## 2022-01-10 ENCOUNTER — TRANSCRIBE ORDERS (OUTPATIENT)
Dept: ADMINISTRATIVE | Facility: HOSPITAL | Age: 62
End: 2022-01-10

## 2022-01-10 DIAGNOSIS — Z01.818 PRE-OPERATIVE CLEARANCE: Primary | ICD-10-CM

## 2022-01-26 ENCOUNTER — LAB (OUTPATIENT)
Dept: LAB | Facility: HOSPITAL | Age: 62
End: 2022-01-26

## 2022-01-26 DIAGNOSIS — Z01.818 PRE-OPERATIVE CLEARANCE: ICD-10-CM

## 2022-01-26 LAB — SARS-COV-2 RNA PNL SPEC NAA+PROBE: NOT DETECTED

## 2022-01-26 PROCEDURE — U0004 COV-19 TEST NON-CDC HGH THRU: HCPCS | Performed by: PLASTIC SURGERY

## 2022-01-26 PROCEDURE — C9803 HOPD COVID-19 SPEC COLLECT: HCPCS

## 2022-02-16 ENCOUNTER — LAB REQUISITION (OUTPATIENT)
Dept: LAB | Facility: HOSPITAL | Age: 62
End: 2022-02-16

## 2022-02-16 DIAGNOSIS — N39.0 URINARY TRACT INFECTION, SITE NOT SPECIFIED: ICD-10-CM

## 2022-02-16 LAB
ALBUMIN SERPL-MCNC: 4.43 G/DL (ref 3.5–5.2)
ALBUMIN/GLOB SERPL: 1.7 G/DL
ALP SERPL-CCNC: 99 U/L (ref 39–117)
ALT SERPL W P-5'-P-CCNC: 15 U/L (ref 1–33)
ANION GAP SERPL CALCULATED.3IONS-SCNC: 10.4 MMOL/L (ref 5–15)
AST SERPL-CCNC: 16 U/L (ref 1–32)
BACTERIA UR QL AUTO: ABNORMAL /HPF
BASOPHILS # BLD AUTO: 0.09 10*3/MM3 (ref 0–0.2)
BASOPHILS NFR BLD AUTO: 0.9 % (ref 0–1.5)
BILIRUB SERPL-MCNC: 0.3 MG/DL (ref 0–1.2)
BILIRUB UR QL STRIP: NEGATIVE
BUN SERPL-MCNC: 14 MG/DL (ref 8–23)
BUN/CREAT SERPL: 18.9 (ref 7–25)
CALCIUM SPEC-SCNC: 9.3 MG/DL (ref 8.6–10.5)
CHLORIDE SERPL-SCNC: 101 MMOL/L (ref 98–107)
CLARITY UR: CLEAR
CO2 SERPL-SCNC: 26.6 MMOL/L (ref 22–29)
COD CRY URNS QL: ABNORMAL /HPF
COLOR UR: ABNORMAL
CREAT SERPL-MCNC: 0.74 MG/DL (ref 0.57–1)
DEPRECATED RDW RBC AUTO: 46.7 FL (ref 37–54)
EOSINOPHIL # BLD AUTO: 0.23 10*3/MM3 (ref 0–0.4)
EOSINOPHIL NFR BLD AUTO: 2.2 % (ref 0.3–6.2)
ERYTHROCYTE [DISTWIDTH] IN BLOOD BY AUTOMATED COUNT: 13.9 % (ref 12.3–15.4)
GFR SERPL CREATININE-BSD FRML MDRD: 80 ML/MIN/1.73
GLOBULIN UR ELPH-MCNC: 2.6 GM/DL
GLUCOSE SERPL-MCNC: 105 MG/DL (ref 65–99)
GLUCOSE UR STRIP-MCNC: NEGATIVE MG/DL
HCT VFR BLD AUTO: 46 % (ref 34–46.6)
HGB BLD-MCNC: 14.8 G/DL (ref 12–15.9)
HGB UR QL STRIP.AUTO: NEGATIVE
HYALINE CASTS UR QL AUTO: ABNORMAL /LPF
IMM GRANULOCYTES # BLD AUTO: 0.03 10*3/MM3 (ref 0–0.05)
IMM GRANULOCYTES NFR BLD AUTO: 0.3 % (ref 0–0.5)
KETONES UR QL STRIP: ABNORMAL
LEUKOCYTE ESTERASE UR QL STRIP.AUTO: ABNORMAL
LYMPHOCYTES # BLD AUTO: 1.76 10*3/MM3 (ref 0.7–3.1)
LYMPHOCYTES NFR BLD AUTO: 17 % (ref 19.6–45.3)
MCH RBC QN AUTO: 29.4 PG (ref 26.6–33)
MCHC RBC AUTO-ENTMCNC: 32.2 G/DL (ref 31.5–35.7)
MCV RBC AUTO: 91.3 FL (ref 79–97)
MONOCYTES # BLD AUTO: 0.22 10*3/MM3 (ref 0.1–0.9)
MONOCYTES NFR BLD AUTO: 2.1 % (ref 5–12)
NEUTROPHILS NFR BLD AUTO: 77.5 % (ref 42.7–76)
NEUTROPHILS NFR BLD AUTO: 8.03 10*3/MM3 (ref 1.7–7)
NITRITE UR QL STRIP: POSITIVE
NRBC BLD AUTO-RTO: 0 /100 WBC (ref 0–0.2)
PH UR STRIP.AUTO: <=5 [PH] (ref 5–8)
PLATELET # BLD AUTO: 549 10*3/MM3 (ref 140–450)
PMV BLD AUTO: 9.9 FL (ref 6–12)
POTASSIUM SERPL-SCNC: 5.2 MMOL/L (ref 3.5–5.2)
PROT SERPL-MCNC: 7 G/DL (ref 6–8.5)
PROT UR QL STRIP: NEGATIVE
RBC # BLD AUTO: 5.04 10*6/MM3 (ref 3.77–5.28)
RBC # UR STRIP: ABNORMAL /HPF
REF LAB TEST METHOD: ABNORMAL
SODIUM SERPL-SCNC: 138 MMOL/L (ref 136–145)
SP GR UR STRIP: 1.03 (ref 1–1.03)
SQUAMOUS #/AREA URNS HPF: ABNORMAL /HPF
UROBILINOGEN UR QL STRIP: ABNORMAL
WBC # UR STRIP: ABNORMAL /HPF
WBC NRBC COR # BLD: 10.36 10*3/MM3 (ref 3.4–10.8)

## 2022-02-16 PROCEDURE — 85025 COMPLETE CBC W/AUTO DIFF WBC: CPT | Performed by: INTERNAL MEDICINE

## 2022-02-16 PROCEDURE — 87086 URINE CULTURE/COLONY COUNT: CPT | Performed by: INTERNAL MEDICINE

## 2022-02-16 PROCEDURE — 81001 URINALYSIS AUTO W/SCOPE: CPT | Performed by: INTERNAL MEDICINE

## 2022-02-16 PROCEDURE — 80053 COMPREHEN METABOLIC PANEL: CPT | Performed by: INTERNAL MEDICINE

## 2022-02-17 LAB — BACTERIA SPEC AEROBE CULT: NO GROWTH

## 2022-03-16 ENCOUNTER — OFFICE VISIT (OUTPATIENT)
Dept: UROLOGY | Facility: CLINIC | Age: 62
End: 2022-03-16

## 2022-03-16 VITALS — BODY MASS INDEX: 27.82 KG/M2 | WEIGHT: 157 LBS | HEIGHT: 63 IN

## 2022-03-16 DIAGNOSIS — R35.0 FREQUENCY OF URINATION: Primary | ICD-10-CM

## 2022-03-16 DIAGNOSIS — R31.0 GROSS HEMATURIA: ICD-10-CM

## 2022-03-16 LAB
BILIRUB BLD-MCNC: NEGATIVE MG/DL
CLARITY, POC: CLEAR
COLOR UR: YELLOW
EXPIRATION DATE: ABNORMAL
GLUCOSE UR STRIP-MCNC: ABNORMAL MG/DL
KETONES UR QL: NEGATIVE
LEUKOCYTE EST, POC: NEGATIVE
Lab: ABNORMAL
NITRITE UR-MCNC: NEGATIVE MG/ML
PH UR: 5.5 [PH] (ref 5–8)
PROT UR STRIP-MCNC: NEGATIVE MG/DL
RBC # UR STRIP: NEGATIVE /UL
SP GR UR: 1.01 (ref 1–1.03)
UROBILINOGEN UR QL: NORMAL

## 2022-03-16 PROCEDURE — 51798 US URINE CAPACITY MEASURE: CPT | Performed by: UROLOGY

## 2022-03-16 PROCEDURE — 99204 OFFICE O/P NEW MOD 45 MIN: CPT | Performed by: UROLOGY

## 2022-03-16 RX ORDER — METOPROLOL SUCCINATE 25 MG/1
25 TABLET, EXTENDED RELEASE ORAL DAILY
COMMUNITY
Start: 2022-02-28

## 2022-03-16 RX ORDER — CITALOPRAM 20 MG/1
1 TABLET ORAL DAILY
COMMUNITY
Start: 2021-11-09

## 2022-03-16 NOTE — PROGRESS NOTES
"     Microscopic Hematuria Female Office Visit      Patient Name: Mandi Baldwin  : 1960   MRN: [id number]     Chief Complaint:  Microscopic Hematuria.     Chief Complaint   Patient presents with   • Difficulty Urinating   • Recurrent uti's     New PAtient    • Urinary Incontinence   .     Referring Provider: Delma Mcluaghlin APRN    History of Present Illness: Ms. Baldwin is a 61 y.o. female with history of microscopic gross hematuria. She presents today for difficulty voiding and gross hematuria.  She has a history of reflux as a child.  She states they went in and \"opened them up\" but does not know what really happens.   She reports she has had dysuria and frequency with that.  She has had episodes like this approximately 4 times over the past 6 months.  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 She has had two vaginal deliveries.  She had a rectovaginal tear at that time.    She also reports incontinence with coughing and strenuous activity.    Today she reports she is not having any symptoms     She drinks 2 pepsi/day and 2 cups of coffee/day.  She drinks about 4 bottles water daily.           Smoking History: 1 ppd x 45 years.      Family History of  malignancy: Grandmother with kidney cancer     Family History of breast, colon, ovarian malignancy: None      Subjective      Review of System: Review of Systems   Constitutional: Positive for fatigue. Negative for chills and fever.   HENT: Positive for sinus pressure. Negative for congestion and sinus pain.    Eyes: Negative for pain and redness.   Respiratory: Positive for shortness of breath. Negative for chest tightness.    Cardiovascular: Negative for chest pain.   Gastrointestinal: Negative for abdominal pain, constipation, diarrhea, nausea and vomiting.   Endocrine: Negative for heat intolerance.   Genitourinary: Positive for dysuria, flank pain, frequency and urgency. Negative for hematuria.   Musculoskeletal: Positive " for back pain. Negative for neck pain.   Skin: Negative for rash.   Allergic/Immunologic: Negative for food allergies.   Neurological: Positive for headaches. Negative for dizziness.   Hematological: Bruises/bleeds easily.   Psychiatric/Behavioral: The patient is nervous/anxious.       I have reviewed the ROS documented by my clinical staff, I have updated appropriately and I agree. Tiffanie Ward MD    Past Medical History:  Past Medical History:   Diagnosis Date   • COPD (chronic obstructive pulmonary disease) (HCC)    • Glaucoma    • Hypertension    • Urinary tract infection        Past Surgical History:  Past Surgical History:   Procedure Laterality Date   • AUGMENTATION MAMMAPLASTY  2007   • CHOLECYSTECTOMY     • EYE SURGERY     • TONSILLECTOMY         Medications:    Current Outpatient Medications:   •  albuterol (PROVENTIL HFA;VENTOLIN HFA) 108 (90 Base) MCG/ACT inhaler, Inhale 2 puffs Every 4 (Four) Hours As Needed for Wheezing., Disp: 1 inhaler, Rfl: 0  •  budesonide-formoterol (SYMBICORT) 160-4.5 MCG/ACT inhaler, Inhale 2 puffs 2 (Two) Times a Day., Disp: 1 inhaler, Rfl: 0  •  citalopram (CeleXA) 20 MG tablet, Take 1 tablet by mouth Daily., Disp: , Rfl:   •  metoprolol succinate XL (TOPROL-XL) 25 MG 24 hr tablet, Take 25 mg by mouth Daily., Disp: , Rfl:   •  tiotropium (SPIRIVA) 18 MCG per inhalation capsule, Place 1 capsule into inhaler and inhale Daily., Disp: 30 capsule, Rfl: 0    Allergies:  Allergies   Allergen Reactions   • Sulfa Antibiotics Anaphylaxis   • Penicillins Rash       Social History:  Social History     Socioeconomic History   • Marital status: Single   Tobacco Use   • Smoking status: Current Every Day Smoker     Packs/day: 0.50     Types: Cigarettes   • Smokeless tobacco: Never Used   Substance and Sexual Activity   • Alcohol use: No   • Drug use: No   • Sexual activity: Defer       Family History:  Family History   Problem Relation Age of Onset   • Hypertension Father    • Lung cancer  "Father    • No Known Problems Mother    • Breast cancer Paternal Aunt          Objective     Physical Exam:   Vital Signs:   Vitals:    03/16/22 0843   Weight: 71.2 kg (157 lb)   Height: 160 cm (63\")     Body mass index is 27.81 kg/m².     Physical Exam  Vitals and nursing note reviewed. Exam conducted with a chaperone present.   Constitutional:       General: She is awake. She is not in acute distress.     Appearance: Normal appearance.   HENT:      Head: Normocephalic and atraumatic.      Right Ear: External ear normal.      Left Ear: External ear normal.      Nose: Nose normal.   Eyes:      Conjunctiva/sclera: Conjunctivae normal.   Pulmonary:      Effort: Pulmonary effort is normal. No respiratory distress.   Abdominal:      General: Abdomen is flat. There is no distension.      Palpations: Abdomen is soft. There is no mass.      Tenderness: There is no abdominal tenderness. There is no right CVA tenderness, left CVA tenderness, guarding or rebound.      Hernia: No hernia is present.   Genitourinary:     Exam position: Lithotomy position.   Skin:     General: Skin is warm.   Neurological:      General: No focal deficit present.      Mental Status: She is alert and oriented to person, place, and time.      Gait: Gait normal.   Psychiatric:         Behavior: Behavior normal. Behavior is cooperative.         Thought Content: Thought content normal.         Judgment: Judgment normal.         Labs:   Brief Urine Lab Results  (Last result in the past 365 days)      Color   Clarity   Blood   Leuk Est   Nitrite   Protein   CREAT   Urine HCG        03/16/22 0855 Yellow   Clear   Negative   Negative   Negative   Negative                 Urine Culture    Urine Culture 2/16/22   Urine Culture No growth              Lab Results   Component Value Date    GLUCOSE 105 (H) 02/16/2022    CALCIUM 9.3 02/16/2022     02/16/2022    K 5.2 02/16/2022    CO2 26.6 02/16/2022     02/16/2022    BUN 14 02/16/2022    CREATININE " 0.74 02/16/2022    EGFRIFNONA 80 02/16/2022    BCR 18.9 02/16/2022    ANIONGAP 10.4 02/16/2022       Lab Results   Component Value Date    WBC 10.36 02/16/2022    HGB 14.8 02/16/2022    HCT 46.0 02/16/2022    MCV 91.3 02/16/2022     (H) 02/16/2022       Images:   No Images in the past 120 days found..    Measures:   Tobacco:   Mandi Baldwin  reports that she has been smoking cigarettes. She has been smoking about 0.50 packs per day. She has never used smokeless tobacco.. I have educated her on the risk of diseases from using tobacco products such as cancer, COPD and heart disease.     I advised her to quit and she is not willing to quit  I spent 3  minutes counseling the patient.      Urine Incontinence: Patient reports that she is currently experiencing any symptoms of urinary incontinence.        Assessment / Plan      Assessment/Plan:   Ms. Mandi Baldwin is a 61 y.o. female who presents today for gross hematuria and frequency/urgency.        The patient was counseled regarding the possible etiologies, relevant work-up and diagnostic approach for microscopic hematuria, as well as the relevant risk categories as assigned by the American U[employer] Association guidelines.  I discussed that the definition of microscopic hematuria includes a microscopic urinalysis (not dipstick UA) positive for greater than 3 RBCs per high-powered field under microscopy.  We also discussed that any history of gross hematuria places a patient at higher risk for occult malignancies and necessitates prompt workup.  We discussed the aforementioned risk categories as assigned by the AUA, which are depicted below.  Ultimately, work-up and diagnosis of microscopic hematuria is driven by risk category.  Given the patient's age, significant smoking history, gross hematuria; the patient is deemed high risk, and for these reasons I recommend proceeding with a flexible diagnostic cystoscopy and CT urogram to assess the collecting system  of the kidney and bladder.     If the patient's microscopic hematuria work-up is negative, per AUA guidelines I would recommend a repeat urinalysis via the patient's primary care provider in 12 months.  If the repeat urinalysis is positive, the patient and I will then need to have a shared decision-making conversation regarding further work-up versus observation, given the low likelihood of identifying etiology in this situation.  If patient were to develop gross hematuria in the interim, the patient would need a total re-evaluation.                 Diagnoses and all orders for this visit:    1. Frequency of urination (Primary)  -     POC Urinalysis Dipstick, Automated  -     Urine Culture - Urine, Urine, Clean Catch    Other orders  -     Bladder Scan         Follow Up:   Return in about 3 weeks (around 4/6/2022) for Procedure next visit, cystoscopy.    I spent approximately 45 minutes providing clinical care for this patient; including review of patient's chart and provider documentation, face to face time spent with patient in examination room (obtaining history, performing physical exam, discussing diagnosis and management options), placing orders, and completing patient documentation.     Tiffanie Ward MD  [facility]

## 2022-03-22 ENCOUNTER — TRANSCRIBE ORDERS (OUTPATIENT)
Dept: ADMINISTRATIVE | Facility: HOSPITAL | Age: 62
End: 2022-03-22

## 2022-03-22 DIAGNOSIS — F17.210 CIGARETTE SMOKER: Primary | ICD-10-CM

## 2022-04-04 ENCOUNTER — HOSPITAL ENCOUNTER (OUTPATIENT)
Dept: CT IMAGING | Facility: HOSPITAL | Age: 62
Discharge: HOME | End: 2022-04-04

## 2022-04-04 DIAGNOSIS — F17.210 CIGARETTE SMOKER: ICD-10-CM

## 2022-04-04 DIAGNOSIS — R31.0 GROSS HEMATURIA: ICD-10-CM

## 2022-04-04 LAB — CREAT BLDA-MCNC: 0.7 MG/DL (ref 0.6–1.3)

## 2022-04-04 PROCEDURE — 82565 ASSAY OF CREATININE: CPT

## 2022-04-04 PROCEDURE — 74178 CT ABD&PLV WO CNTR FLWD CNTR: CPT | Performed by: RADIOLOGY

## 2022-04-04 PROCEDURE — 71271 CT THORAX LUNG CANCER SCR C-: CPT | Performed by: RADIOLOGY

## 2022-04-04 PROCEDURE — 74178 CT ABD&PLV WO CNTR FLWD CNTR: CPT

## 2022-04-04 PROCEDURE — 71271 CT THORAX LUNG CANCER SCR C-: CPT

## 2022-04-04 PROCEDURE — 25010000002 IOPAMIDOL 61 % SOLUTION: Performed by: UROLOGY

## 2022-04-04 RX ADMIN — IOPAMIDOL 80 ML: 612 INJECTION, SOLUTION INTRAVENOUS at 14:18

## 2022-04-11 ENCOUNTER — OFFICE VISIT (OUTPATIENT)
Dept: UROLOGY | Facility: CLINIC | Age: 62
End: 2022-04-11

## 2022-04-11 VITALS — WEIGHT: 157 LBS | HEIGHT: 63 IN | BODY MASS INDEX: 27.82 KG/M2

## 2022-04-11 DIAGNOSIS — R33.9 INCOMPLETE EMPTYING OF BLADDER: Primary | ICD-10-CM

## 2022-04-11 DIAGNOSIS — Z48.816 AFTERCARE FOLLOWING SURGERY OF THE GENITOURINARY SYSTEM: ICD-10-CM

## 2022-04-11 PROCEDURE — 96372 THER/PROPH/DIAG INJ SC/IM: CPT | Performed by: UROLOGY

## 2022-04-11 PROCEDURE — 52000 CYSTOURETHROSCOPY: CPT | Performed by: UROLOGY

## 2022-04-11 PROCEDURE — 51798 US URINE CAPACITY MEASURE: CPT | Performed by: UROLOGY

## 2022-04-11 RX ORDER — GENTAMICIN SULFATE 40 MG/ML
80 INJECTION, SOLUTION INTRAMUSCULAR; INTRAVENOUS ONCE
Status: COMPLETED | OUTPATIENT
Start: 2022-04-11 | End: 2022-04-11

## 2022-04-11 RX ADMIN — GENTAMICIN SULFATE 80 MG: 40 INJECTION, SOLUTION INTRAMUSCULAR; INTRAVENOUS at 09:42

## 2022-04-11 NOTE — PROGRESS NOTES
Follow Up Office Visit      Patient Name: Mandi Baldwin  : 1960   MRN: [id number]     Chief Complaint:    Chief Complaint   Patient presents with   • Urinary Frequency              Referring Provider: No ref. provider found    History of Present Illness: Mandi Baldwin is a 62 y.o. female who presents today for follow up of microhematuria.  She is here today for cystoscopy.  She reports no dysuria or gross hematuria.  She has done well since her last visit.  She does continue to complain of significant urgency and frequency with occasional urge incontinence.    I reviewed her CT urogram from 22 which did not reveal any concerning findings.  Her left renal pelvis appears to be exophytic but drains well.    Preprocedure diagnosis  Microhematuria    Postprocedure diagnosis  Same    Procedure  Flexible Cystourethroscopy    Attending surgeon  Tiffanie Ward MD    Anesthesia  2% lidocaine jelly intraurethrally    Complications  None    Indications  62 y.o. female undergoing a flexible cystoscopy for the above mentioned indications.      Informed consent was obtained prior to the procedure start.       Findings  Cystoscopy revealed normal bladder mucosa with NO tumors, masses, stones or trabeculations noted.      Procedure  The patient was placed in supine position and prepped and draped in sterile fashion with lidocaine jelly instilled 5 minutes pre-procedure start.  A brief timeout including available nursing staff and awake patient was performed.  The 16 Fr digital flexible cystoscope was lubricated and gently placed into the urethral meatus. The proximal and distal portions of the urethra appeared well vascularized and normal in appearance. The bladder neck was visualized and appeared well vascularized without mucosal lesions or abnormal appearance. The bladder was then entered and  completely visualized including the trigone. There were bilateral orthotopic ureteral orifices which appeared patent and  effluxed clear yellow urine. The posterior wall, lateral walls, anterior wall, and dome were visualized. The cystoscope was then retroflexed and the bladder neck was further visualized and appeared normal.  The scope was gently withdrawn and the procedure terminated.  The patient tolerated the procedure well.         Subjective      Review of System: Review of Systems   Constitutional: Positive for fatigue. Negative for chills and fever.   HENT: Positive for sinus pressure. Negative for congestion and sinus pain.    Eyes: Negative for pain and redness.   Respiratory: Positive for shortness of breath. Negative for chest tightness.    Cardiovascular: Negative for chest pain.   Gastrointestinal: Negative for abdominal pain, constipation, diarrhea, nausea and vomiting.   Endocrine: Negative for heat intolerance.   Genitourinary: Positive for dysuria, flank pain, frequency and urgency. Negative for hematuria.   Musculoskeletal: Positive for back pain. Negative for neck pain.   Skin: Negative for rash.   Allergic/Immunologic: Negative for food allergies.   Neurological: Positive for headaches. Negative for dizziness.   Hematological: Bruises/bleeds easily.   Psychiatric/Behavioral: The patient is nervous/anxious.       I have reviewed the ROS documented by my clinical staff, I have updated appropriately and I agree. Tiffanie Ward MD    I have reviewed and the following portions of the patient's history were updated as appropriate: past family history, past medical history, past social history, past surgical history and problem list.    Past Medical History:   Past Medical History:   Diagnosis Date   • COPD (chronic obstructive pulmonary disease) (HCC)    • Glaucoma    • Hypertension    • Urinary tract infection        Past Surgical History:  Past Surgical History:   Procedure Laterality Date   • AUGMENTATION MAMMAPLASTY  2007   • CHOLECYSTECTOMY     • EYE SURGERY     • TONSILLECTOMY         Family History:   family  "history includes Breast cancer in her paternal aunt; Hypertension in her father; Lung cancer in her father; No Known Problems in her mother.   Otherwise pertinent FHx was reviewed and not pertinent to current issue.    Social History:    reports that she has been smoking cigarettes. She has been smoking about 0.50 packs per day. She has never used smokeless tobacco. She reports that she does not drink alcohol and does not use drugs.    Medications:     Current Outpatient Medications:   •  albuterol (PROVENTIL HFA;VENTOLIN HFA) 108 (90 Base) MCG/ACT inhaler, Inhale 2 puffs Every 4 (Four) Hours As Needed for Wheezing., Disp: 1 inhaler, Rfl: 0  •  budesonide-formoterol (SYMBICORT) 160-4.5 MCG/ACT inhaler, Inhale 2 puffs 2 (Two) Times a Day., Disp: 1 inhaler, Rfl: 0  •  citalopram (CeleXA) 20 MG tablet, Take 1 tablet by mouth Daily., Disp: , Rfl:   •  metoprolol succinate XL (TOPROL-XL) 25 MG 24 hr tablet, Take 25 mg by mouth Daily., Disp: , Rfl:   •  tiotropium (SPIRIVA) 18 MCG per inhalation capsule, Place 1 capsule into inhaler and inhale Daily., Disp: 30 capsule, Rfl: 0  No current facility-administered medications for this visit.    Allergies:   Allergies   Allergen Reactions   • Sulfa Antibiotics Anaphylaxis   • Penicillins Rash       Post void residual bladder scan:   0 mL     Objective     Physical Exam:   Vital Signs:   Vitals:    04/11/22 0850   Weight: 71.2 kg (157 lb)   Height: 160 cm (62.99\")     Body mass index is 27.82 kg/m².     Physical Exam  Vitals and nursing note reviewed. Exam conducted with a chaperone present.   Constitutional:       General: She is awake. She is not in acute distress.     Appearance: Normal appearance.   HENT:      Head: Normocephalic and atraumatic.      Right Ear: External ear normal.      Left Ear: External ear normal.      Nose: Nose normal.   Eyes:      Conjunctiva/sclera: Conjunctivae normal.   Pulmonary:      Effort: Pulmonary effort is normal. No respiratory distress. "   Abdominal:      General: Abdomen is flat. There is no distension.      Palpations: Abdomen is soft. There is no mass.      Tenderness: There is no abdominal tenderness. There is no right CVA tenderness, left CVA tenderness, guarding or rebound.      Hernia: No hernia is present.   Genitourinary:     Exam position: Lithotomy position.   Skin:     General: Skin is warm.   Neurological:      General: No focal deficit present.      Mental Status: She is alert and oriented to person, place, and time.      Gait: Gait normal.   Psychiatric:         Behavior: Behavior normal. Behavior is cooperative.         Thought Content: Thought content normal.         Judgment: Judgment normal.         Labs:   Brief Urine Lab Results  (Last result in the past 365 days)      Color   Clarity   Blood   Leuk Est   Nitrite   Protein   CREAT   Urine HCG        03/16/22 0855 Yellow   Clear   Negative   Negative   Negative   Negative                 Urine Culture    Urine Culture 2/16/22   Urine Culture No growth              Lab Results   Component Value Date    GLUCOSE 105 (H) 02/16/2022    CALCIUM 9.3 02/16/2022     02/16/2022    K 5.2 02/16/2022    CO2 26.6 02/16/2022     02/16/2022    BUN 14 02/16/2022    CREATININE 0.70 04/04/2022    EGFRIFNONA 80 02/16/2022    BCR 18.9 02/16/2022    ANIONGAP 10.4 02/16/2022       Lab Results   Component Value Date    WBC 10.36 02/16/2022    HGB 14.8 02/16/2022    HCT 46.0 02/16/2022    MCV 91.3 02/16/2022     (H) 02/16/2022       No results found for: PSA    Images:   CT Abdomen Pelvis With & Without Contrast    Result Date: 4/4/2022  A source of the patient's hematuria was not demonstrated.        1389.65 mGy.cm The radiation dose reduction device was utilized for each scan per the ALARA (as low as reasonably achievable) protocol.  This report was finalized on 4/4/2022 3:01 PM by Dr. Christian Staley II, MD.      CT Chest Low Dose Cancer Screening WO    Result Date: 4/4/2022  Lung  RAD 2 probably benign 3.5 mm nodule left upper lobe  Repeat CT: 12 months from screening exam.   Nodule     Low-Risk Patient       High-Risk Patient  Size < 4 mm     no follow up needed    follow up CT at 12 months                                   if unchanged, no follow up  4-8 mm       follow up CT at 6, 12 and 24 months               if no change, further follow up  > 8 mm     contrast enhanced CT, PET and/or biopsy, OR            watchful waiting: follow up CT at 3, 9 & 24 months   Notes: diameter = average width; high risk is defined as a history of smoking or other know risk factors for lung cancer; low risk is defined as minimal or absent history of smoking or other known risk factors; caveat: nodules with a ground glass component may require longer follow up to exclude indolent adenocarcinoma  This report was finalized on 4/4/2022 2:40 PM by Dr. Christian Staley II, MD.          Measures:   Tobacco:   Mandi Baldwin  reports that she has been smoking cigarettes. She has been smoking about 0.50 packs per day. She has never used smokeless tobacco.. I have educated her on the risk of diseases from using tobacco products such as cancer, COPD and heart disease.     I advised her to quit and she is not willing to quit.    I spent 3  minutes counseling the patient.           Urine Incontinence: Patient reports that she is currently experiencing any symptoms of urinary incontinence.         Assessment / Plan      Assessment/Plan:   62 y.o. female who presented today for follow up of microhematuria.  Her cystoscopy today was negative.  Her CT urogram also did not reveal any concerning findings.  As far as her urgency and frequency it appears that she may have overactive bladder.  I will give her a trial of Gemtesa.  Discussed the specific risks and benefits along with the possible side effects of her medication.  I also discussed that she needs to check her blood pressure initially while she is on the medication.  I  will have her follow-up in 3 months for symptom check.    Diagnoses and all orders for this visit:    1. Incomplete emptying of bladder (Primary)  -     Bladder Scan    2. Aftercare following surgery of the genitourinary system  -     gentamicin (GARAMYCIN) injection 80 mg           Follow Up:   Return in about 3 months (around 7/11/2022) for Recheck.    I spent approximately 20 minutes providing clinical care for this patient; including review of patient's chart and provider documentation, face to face time spent with patient in examination room (obtaining history, performing physical exam, discussing diagnosis and management options), placing orders, and completing patient documentation.     Tiffanie Ward MD  [facility]

## 2022-04-20 ENCOUNTER — TRANSCRIBE ORDERS (OUTPATIENT)
Dept: ADMINISTRATIVE | Facility: HOSPITAL | Age: 62
End: 2022-04-20

## 2022-04-20 DIAGNOSIS — R06.00 DYSPNEA, UNSPECIFIED TYPE: Primary | ICD-10-CM

## 2022-04-28 ENCOUNTER — HOSPITAL ENCOUNTER (OUTPATIENT)
Dept: CARDIOLOGY | Facility: HOSPITAL | Age: 62
Discharge: HOME | End: 2022-04-28

## 2022-04-28 ENCOUNTER — HOSPITAL ENCOUNTER (OUTPATIENT)
Dept: RESPIRATORY THERAPY | Facility: HOSPITAL | Age: 62
Discharge: HOME | End: 2022-04-28

## 2022-04-28 DIAGNOSIS — R06.00 DYSPNEA, UNSPECIFIED TYPE: ICD-10-CM

## 2022-04-28 PROCEDURE — 94664 DEMO&/EVAL PT USE INHALER: CPT

## 2022-04-28 PROCEDURE — 94729 DIFFUSING CAPACITY: CPT

## 2022-04-28 PROCEDURE — 94726 PLETHYSMOGRAPHY LUNG VOLUMES: CPT | Performed by: INTERNAL MEDICINE

## 2022-04-28 PROCEDURE — 94640 AIRWAY INHALATION TREATMENT: CPT

## 2022-04-28 PROCEDURE — 94060 EVALUATION OF WHEEZING: CPT | Performed by: INTERNAL MEDICINE

## 2022-04-28 PROCEDURE — 93306 TTE W/DOPPLER COMPLETE: CPT | Performed by: INTERNAL MEDICINE

## 2022-04-28 PROCEDURE — 94729 DIFFUSING CAPACITY: CPT | Performed by: INTERNAL MEDICINE

## 2022-04-28 PROCEDURE — 94060 EVALUATION OF WHEEZING: CPT

## 2022-04-28 PROCEDURE — 93306 TTE W/DOPPLER COMPLETE: CPT

## 2022-04-28 PROCEDURE — 94726 PLETHYSMOGRAPHY LUNG VOLUMES: CPT

## 2022-04-28 RX ORDER — ALBUTEROL SULFATE 2.5 MG/3ML
2.5 SOLUTION RESPIRATORY (INHALATION) ONCE
Status: COMPLETED | OUTPATIENT
Start: 2022-04-28 | End: 2022-04-28

## 2022-04-28 RX ADMIN — ALBUTEROL SULFATE 2.5 MG: 2.5 SOLUTION RESPIRATORY (INHALATION) at 10:04

## 2022-05-01 LAB
BH CV ECHO MEAS - AO ROOT DIAM: 2.8 CM
BH CV ECHO MEAS - EDV(CUBED): 50.7 ML
BH CV ECHO MEAS - EDV(MOD-SP4): 33.9 ML
BH CV ECHO MEAS - EF(MOD-SP4): 72.4 %
BH CV ECHO MEAS - ESV(CUBED): 15.6 ML
BH CV ECHO MEAS - ESV(MOD-SP4): 9.4 ML
BH CV ECHO MEAS - FS: 32.4 %
BH CV ECHO MEAS - IVS/LVPW: 0.88 CM
BH CV ECHO MEAS - IVSD: 1.4 CM
BH CV ECHO MEAS - LA DIMENSION: 2.7 CM
BH CV ECHO MEAS - LAT PEAK E' VEL: 6.4 CM/SEC
BH CV ECHO MEAS - LV DIASTOLIC VOL/BSA (35-75): 19.7 CM2
BH CV ECHO MEAS - LV MASS(C)D: 208.7 GRAMS
BH CV ECHO MEAS - LV SYSTOLIC VOL/BSA (12-30): 5.4 CM2
BH CV ECHO MEAS - LVIDD: 3.7 CM
BH CV ECHO MEAS - LVIDS: 2.5 CM
BH CV ECHO MEAS - LVOT AREA: 2.27 CM2
BH CV ECHO MEAS - LVOT DIAM: 1.7 CM
BH CV ECHO MEAS - LVPWD: 1.6 CM
BH CV ECHO MEAS - MED PEAK E' VEL: 6.3 CM/SEC
BH CV ECHO MEAS - MV A MAX VEL: 106 CM/SEC
BH CV ECHO MEAS - MV E MAX VEL: 70.7 CM/SEC
BH CV ECHO MEAS - MV E/A: 0.67
BH CV ECHO MEAS - PA ACC TIME: 0.06 SEC
BH CV ECHO MEAS - PA PR(ACCEL): 52 MMHG
BH CV ECHO MEAS - SI(MOD-SP4): 14.2 ML/M2
BH CV ECHO MEAS - SV(MOD-SP4): 24.5 ML
BH CV ECHO MEASUREMENTS AVERAGE E/E' RATIO: 11.13
LEFT ATRIUM VOLUME INDEX: 22 ML/M2
MAXIMAL PREDICTED HEART RATE: 157 BPM
STRESS TARGET HR: 133 BPM

## 2023-04-06 ENCOUNTER — TRANSCRIBE ORDERS (OUTPATIENT)
Dept: PULMONOLOGY | Facility: HOSPITAL | Age: 63
End: 2023-04-06
Payer: COMMERCIAL

## 2023-04-06 DIAGNOSIS — Z12.31 SCREENING MAMMOGRAM FOR BREAST CANCER: Primary | ICD-10-CM

## 2023-04-24 DIAGNOSIS — Z12.11 ENCOUNTER FOR SCREENING FOR MALIGNANT NEOPLASM OF COLON: Primary | ICD-10-CM

## 2023-04-24 RX ORDER — POLYETHYLENE GLYCOL 3350 17 G/17G
510 POWDER, FOR SOLUTION ORAL ONCE
Qty: 510 G | Refills: 0 | Status: SHIPPED | OUTPATIENT
Start: 2023-04-24 | End: 2023-04-24

## 2023-04-24 RX ORDER — BISACODYL 5 MG/1
20 TABLET, DELAYED RELEASE ORAL ONCE
Qty: 4 TABLET | Refills: 0 | Status: SHIPPED | OUTPATIENT
Start: 2023-04-24 | End: 2023-04-24

## 2023-05-05 ENCOUNTER — HOSPITAL ENCOUNTER (OUTPATIENT)
Dept: MAMMOGRAPHY | Facility: HOSPITAL | Age: 63
Discharge: HOME | End: 2023-05-05
Admitting: INTERNAL MEDICINE
Payer: COMMERCIAL

## 2023-05-05 DIAGNOSIS — Z12.31 SCREENING MAMMOGRAM FOR BREAST CANCER: ICD-10-CM

## 2023-05-05 PROCEDURE — 77067 SCR MAMMO BI INCL CAD: CPT | Performed by: RADIOLOGY

## 2023-05-05 PROCEDURE — 77063 BREAST TOMOSYNTHESIS BI: CPT | Performed by: RADIOLOGY

## 2023-05-05 PROCEDURE — 77063 BREAST TOMOSYNTHESIS BI: CPT

## 2023-05-05 PROCEDURE — 77067 SCR MAMMO BI INCL CAD: CPT

## 2023-06-16 RX ORDER — POLYETHYLENE GLYCOL 3350 17 G/17G
510 POWDER, FOR SOLUTION ORAL ONCE
Qty: 510 G | Refills: 0 | Status: SHIPPED | OUTPATIENT
Start: 2023-06-16 | End: 2023-06-16

## 2023-06-16 RX ORDER — BISACODYL 5 MG/1
20 TABLET, DELAYED RELEASE ORAL ONCE
Qty: 4 TABLET | Refills: 0 | Status: SHIPPED | OUTPATIENT
Start: 2023-06-16 | End: 2023-06-16

## 2023-06-19 ENCOUNTER — ANESTHESIA (OUTPATIENT)
Dept: PERIOP | Facility: HOSPITAL | Age: 63
End: 2023-06-19
Payer: COMMERCIAL

## 2023-06-19 ENCOUNTER — ANESTHESIA EVENT (OUTPATIENT)
Dept: PERIOP | Facility: HOSPITAL | Age: 63
End: 2023-06-19
Payer: COMMERCIAL

## 2023-06-19 PROCEDURE — 25010000002 PHENYLEPHRINE 10 MG/ML SOLUTION: Performed by: NURSE ANESTHETIST, CERTIFIED REGISTERED

## 2023-06-19 PROCEDURE — 25010000002 PROPOFOL 200 MG/20ML EMULSION: Performed by: NURSE ANESTHETIST, CERTIFIED REGISTERED

## 2023-06-19 RX ORDER — PROPOFOL 10 MG/ML
INJECTION, EMULSION INTRAVENOUS CONTINUOUS PRN
Status: DISCONTINUED | OUTPATIENT
Start: 2023-06-19 | End: 2023-06-19 | Stop reason: SURG

## 2023-06-19 RX ORDER — PHENYLEPHRINE HYDROCHLORIDE 10 MG/ML
INJECTION INTRAVENOUS AS NEEDED
Status: DISCONTINUED | OUTPATIENT
Start: 2023-06-19 | End: 2023-06-19 | Stop reason: SURG

## 2023-06-19 RX ADMIN — PROPOFOL 200 MCG/KG/MIN: 10 INJECTION, EMULSION INTRAVENOUS at 09:34

## 2023-06-19 RX ADMIN — SODIUM CHLORIDE, POTASSIUM CHLORIDE, SODIUM LACTATE AND CALCIUM CHLORIDE: 600; 310; 30; 20 INJECTION, SOLUTION INTRAVENOUS at 09:30

## 2023-06-19 RX ADMIN — PHENYLEPHRINE HYDROCHLORIDE 100 MCG: 10 INJECTION INTRAVENOUS at 09:56

## 2023-06-19 RX ADMIN — PHENYLEPHRINE HYDROCHLORIDE 100 MCG: 10 INJECTION INTRAVENOUS at 09:40

## 2023-06-19 NOTE — ANESTHESIA PREPROCEDURE EVALUATION
Anesthesia Evaluation     Patient summary reviewed and Nursing notes reviewed   no history of anesthetic complications:  NPO Solid Status: > 8 hours  NPO Liquid Status: > 8 hours           Airway   Mallampati: II  TM distance: >3 FB  Neck ROM: full  No difficulty expected  Dental - normal exam   (+) poor dentition and upper dentures    Pulmonary    (+) a smoker Current Smoked day of surgery, COPD moderate, decreased breath sounds,   Cardiovascular - normal exam    Rhythm: regular  Rate: normal    (+) hypertension,       Neuro/Psych- negative ROS  GI/Hepatic/Renal/Endo    (+) obesity,       Musculoskeletal (-) negative ROS    Abdominal  - normal exam   Substance History - negative use     OB/GYN negative ob/gyn ROS         Other - negative ROS       ROS/Med Hx Other: Echo 4/28/2022:  Normal left ventricular cavity size and wall thickness noted. All left ventricular wall segments contract normally.  Left ventricular ejection fraction appears to be 61 - 65%.  Left ventricular diastolic function is consistent with (grade I) impaired relaxation.  The aortic valve is not well visualized. No aortic valve regurgitation or stenosis is present. The aortic valve is abnormal in structure. The aortic valve exhibits sclerosis.  The mitral valve is structurally normal with no regurgitation or significant stenosis present.  There is no evidence of pericardial effusion.        Phys Exam Other: In situ dent are in poor repair.  Severe gum recession and broken dent.                 Anesthesia Plan    ASA 3     general   total IV anesthesia  intravenous induction     Anesthetic plan, risks, benefits, and alternatives have been provided, discussed and informed consent has been obtained with: patient.  Pre-procedure education provided  Plan discussed with CRNA.        CODE STATUS:

## 2023-06-19 NOTE — ANESTHESIA POSTPROCEDURE EVALUATION
Patient: Mandi Baldwin    Procedure Summary     Date: 06/19/23 Room / Location:  [facility] /  [facility]    Anesthesia Start: 0932 Anesthesia Stop: 1010    Procedure: COLONOSCOPY Diagnosis:       Encounter for screening for malignant neoplasm of colon      (Encounter for screening for malignant neoplasm of colon [Z12.11])    Surgeons: Cuong Rush MD Provider: Mathieu Marte DO    Anesthesia Type: general ASA Status: 3          Anesthesia Type: general    Vitals  Vitals Value Taken Time   /86 06/19/23 1041   Temp 98.6 °F (37 °C) 06/19/23 1011   Pulse 61 06/19/23 1041   Resp 16 06/19/23 1041   SpO2 95 % 06/19/23 1041           Post Anesthesia Care and Evaluation    Patient location during evaluation: PHASE II  Patient participation: complete - patient participated  Level of consciousness: awake and alert  Pain score: 0  Pain management: adequate    Airway patency: patent  Anesthetic complications: No anesthetic complications  PONV Status: controlled  Cardiovascular status: acceptable  Respiratory status: acceptable and room air  Hydration status: euvolemic  No anesthesia care post op

## 2023-07-20 ENCOUNTER — TRANSCRIBE ORDERS (OUTPATIENT)
Dept: ADMINISTRATIVE | Facility: HOSPITAL | Age: 63
End: 2023-07-20
Payer: COMMERCIAL

## 2023-07-20 DIAGNOSIS — Z87.891 PERSONAL HISTORY OF TOBACCO USE, PRESENTING HAZARDS TO HEALTH: Primary | ICD-10-CM

## 2023-09-01 ENCOUNTER — HOSPITAL ENCOUNTER (OUTPATIENT)
Dept: CT IMAGING | Facility: HOSPITAL | Age: 63
Discharge: HOME | End: 2023-09-01
Admitting: INTERNAL MEDICINE
Payer: COMMERCIAL

## 2023-09-01 DIAGNOSIS — Z87.891 PERSONAL HISTORY OF TOBACCO USE, PRESENTING HAZARDS TO HEALTH: ICD-10-CM

## 2023-09-01 PROCEDURE — 71271 CT THORAX LUNG CANCER SCR C-: CPT

## 2023-10-23 ENCOUNTER — OFFICE VISIT (OUTPATIENT)
Dept: PULMONOLOGY | Facility: CLINIC | Age: 63
End: 2023-10-23
Payer: COMMERCIAL

## 2023-10-23 ENCOUNTER — PATIENT OUTREACH (OUTPATIENT)
Dept: PULMONOLOGY | Facility: CLINIC | Age: 63
End: 2023-10-23
Payer: COMMERCIAL

## 2023-10-23 VITALS
DIASTOLIC BLOOD PRESSURE: 68 MMHG | OXYGEN SATURATION: 95 % | BODY MASS INDEX: 30.12 KG/M2 | HEIGHT: 63 IN | TEMPERATURE: 98.9 F | SYSTOLIC BLOOD PRESSURE: 112 MMHG | WEIGHT: 170 LBS | HEART RATE: 74 BPM

## 2023-10-23 DIAGNOSIS — R91.8 INFILTRATE OF LOWER LOBE OF LEFT LUNG PRESENT ON IMAGING STUDY: ICD-10-CM

## 2023-10-23 DIAGNOSIS — Z72.0 TOBACCO ABUSE: ICD-10-CM

## 2023-10-23 DIAGNOSIS — R91.8 MULTIPLE PULMONARY NODULES: ICD-10-CM

## 2023-10-23 DIAGNOSIS — J44.9 COPD MIXED TYPE: ICD-10-CM

## 2023-10-23 DIAGNOSIS — Z23 IMMUNIZATION DUE: Primary | ICD-10-CM

## 2023-10-23 DIAGNOSIS — R05.3 CHRONIC COUGH: ICD-10-CM

## 2023-10-23 DIAGNOSIS — R06.09 DOE (DYSPNEA ON EXERTION): ICD-10-CM

## 2023-10-23 RX ORDER — NICOTINE 21 MG/24HR
PATCH, TRANSDERMAL 24 HOURS TRANSDERMAL
COMMUNITY
Start: 2023-07-25

## 2023-10-23 NOTE — PROGRESS NOTES
PULMONARY  NOTE    Chief Complaint     Abnormal CT scan of the chest, tobacco abuse, COPD, dyspnea on exertion, chronic cough    History of Present Illness     63-year-old female referred for an abnormal CT scan of the chest    She had a CT scan of the chest in September with results as noted below    She has a long history of tobacco abuse which is ongoing  She is using nicotine patches and is cutting down but has not set a smoking cessation date, yet    She has a diagnosis of COPD  She is on Symbicort with albuterol  She was prescribed Spiriva in the past but it was unaffordable    Her weight has been stable    She has a chronic cough and produces thick white sputum  She has never had hemoptysis    Patient Active Problem List   Diagnosis    Encounter for screening for malignant neoplasm of colon    Multiple pulmonary nodules (Two stable right sided nodules)    Lingular infiltrate    Stage III (Severe) COPD    Tobacco abuse    RAMIRES (dyspnea on exertion)    Chronic cough      Allergies   Allergen Reactions    Sulfa Antibiotics Anaphylaxis    Penicillins Rash       Current Outpatient Medications:     albuterol (PROVENTIL HFA;VENTOLIN HFA) 108 (90 Base) MCG/ACT inhaler, Inhale 2 puffs Every 4 (Four) Hours As Needed for Wheezing., Disp: 1 inhaler, Rfl: 0    aspirin 81 MG chewable tablet, Chew 1 tablet Daily., Disp: 30 tablet, Rfl: 11    bisoprolol-hydrochlorothiazide (ZIAC) 5-6.25 MG per tablet, Take 1 tablet by mouth Daily., Disp: , Rfl:     budesonide-formoterol (SYMBICORT) 160-4.5 MCG/ACT inhaler, Inhale 2 puffs 2 (Two) Times a Day., Disp: 1 inhaler, Rfl: 0    buPROPion XL (WELLBUTRIN XL) 300 MG 24 hr tablet, Take 1 tablet by mouth Daily., Disp: , Rfl:     citalopram (CeleXA) 20 MG tablet, Take 1 tablet by mouth Daily., Disp: , Rfl:     nicotine (NICODERM CQ) 21 MG/24HR patch, APPLY 1 PATCH TOPICALLY TO THE SKIN IN THE MORNING, Disp: , Rfl:     ranolazine (RANEXA) 500 MG 12 hr tablet, TAKE 1 TABLET BY MOUTH EVERY  "MORNING AND 1 TABLET BY MOUTH AT BEDTIME, Disp: , Rfl:     Timolol Maleate, Once-Daily, 0.5 % solution, INSTILL 1 DROP INTO BOTH EYES EVERY MORNING, Disp: , Rfl:     Xiidra 5 % ophthalmic solution, Administer 1 drop to both eyes 1 (One) Time., Disp: , Rfl:   MEDICATION LIST AND ALLERGIES REVIEWED.    Family History   Problem Relation Age of Onset    Dementia Mother     Hypertension Mother     Hypertension Father     Lung cancer Father     Hypertension Sister     COPD Sister     Breast cancer Paternal Aunt     Cancer Maternal Grandmother     Heart disease Maternal Grandfather     Cancer Paternal Grandfather      Social History     Tobacco Use    Smoking status: Every Day     Packs/day: 0.50     Years: 47.00     Additional pack years: 0.00     Total pack years: 23.50     Types: Cigarettes     Passive exposure: Current    Smokeless tobacco: Never   Vaping Use    Vaping Use: Former   Substance Use Topics    Alcohol use: Not Currently    Drug use: No     Social History     Social History Narrative    Single    Disabled    Ongoing tobacco abuse without a smoking cessation date set,     FAMILY AND SOCIAL HISTORY REVIEWED.    Review of Systems  IF PRESENT REFER TO SCANNED ROS SHEET FROM SAME DATE  OTHERWISE ROS OBTAINED AND NON-CONTRIBUTORY OVER HPI.    /68 (BP Location: Left arm, Patient Position: Sitting)   Pulse 74   Temp 98.9 °F (37.2 °C)   Ht 160 cm (63\")   Wt 77.1 kg (170 lb)   SpO2 95%   BMI 30.11 kg/m²   Physical Exam  Vitals and nursing note reviewed.   Constitutional:       General: She is not in acute distress.     Appearance: She is well-developed. She is not diaphoretic.   HENT:      Head: Normocephalic and atraumatic.   Neck:      Thyroid: No thyromegaly.   Cardiovascular:      Rate and Rhythm: Normal rate and regular rhythm.      Heart sounds: Normal heart sounds. No murmur heard.  Pulmonary:      Effort: Pulmonary effort is normal.      Breath sounds: Normal breath sounds. No stridor. "   Lymphadenopathy:      Cervical: No cervical adenopathy.      Upper Body:      Right upper body: No supraclavicular or epitrochlear adenopathy.      Left upper body: No supraclavicular or epitrochlear adenopathy.   Skin:     General: Skin is warm and dry.   Neurological:      Mental Status: She is alert and oriented to person, place, and time.   Psychiatric:         Behavior: Behavior normal.         Results     CT scan of the chest from 9/1/2023 reviewed on PACS  CT scan of the chest from 2022 reviewed on PACS  2 right-sided pulmonary nodules are stable over the interval with some new area of focal consolidation in the lingula    PFTs reviewed  Severe airway obstruction without significant improvement in FEV1 after bronchodilator  No restriction but reduced diffusion capacity    Immunization History   Administered Date(s) Administered    COVID-19 (PFIZER) Purple Cap Monovalent 12/19/2020, 01/09/2021, 09/27/2021    Fluzone (or Fluarix & Flulaval for VFC) >6mos 11/08/2022, 10/23/2023    Tdap 04/15/2020     Problem List       ICD-10-CM ICD-9-CM   1. Immunization due  Z23 V05.9   2. Chronic cough  R05.3 786.2   3. RAMIRES (dyspnea on exertion)  R06.09 786.09   4. Lingular infiltrate  R91.8 793.19   5. Multiple pulmonary nodules (Two stable right sided nodules)  R91.8 793.19   6. Stage III (Severe) COPD  J44.9 496   7. Tobacco abuse  Z72.0 305.1       Discussion     We reviewed her test results  She has an abnormal CT scan of the chest  She has a couple right-sided pulmonary nodules that are stable back to her prior scan from 2022 but she has a new area of consolidation in the lingula, not present 2022  This may be inflammatory or may just represent some mucous plugging but does bear close follow-up  I would consider this a lung RADS category 4B at least and have recommended a follow-up CT scan of the chest which we will arrange    I encouraged her smoking cessation efforts    She is can remain on Symbicort with  albuterol  We will see if we can get her a LAMA that is affordable    We will check an alpha-1 antitrypsin screen today    Follow-up based on the results of her CT scan    Moderate level of Medical Decision Making complexity based on 1 undiagnosed new problem or 2 stable chronic conditions, independent interpretation of tests, and/or prescription drug management     Corona Henson MD  Note electronically signed    CC: Luís Coon MD

## 2023-10-23 NOTE — SIGNIFICANT NOTE
Nurse Navigator met with patient on this date.  Patient was referred to lung nodule clinic to be seen in new patient consultation with Dr. Henson following an abnormal chest CT scan.  Dr. Henson reviewed and gave printed materials on lung nodules and Lung RADS scoring.  Dr. Henson's recommendations are to complete an Alpha-1 test, repeat a chest CT scan (soon), and to encourage smoking cessation.  Dr. Henson will follow up on repeat chest CT and make further recommendations at that time.  The role of the NN was introduced to patient.  NN contact information was provided and encouraged for patient to call with any questions or concerns.  Patient verbalized understanding and is in agreement with her plan of care.

## 2023-10-27 ENCOUNTER — PATIENT OUTREACH (OUTPATIENT)
Dept: PULMONOLOGY | Facility: CLINIC | Age: 63
End: 2023-10-27
Payer: COMMERCIAL

## 2023-11-09 ENCOUNTER — HOSPITAL ENCOUNTER (OUTPATIENT)
Dept: CT IMAGING | Facility: HOSPITAL | Age: 63
Discharge: HOME | End: 2023-11-09
Admitting: INTERNAL MEDICINE
Payer: COMMERCIAL

## 2023-11-09 DIAGNOSIS — R06.09 DOE (DYSPNEA ON EXERTION): ICD-10-CM

## 2023-11-09 DIAGNOSIS — Z72.0 TOBACCO ABUSE: ICD-10-CM

## 2023-11-09 DIAGNOSIS — Z23 IMMUNIZATION DUE: ICD-10-CM

## 2023-11-09 DIAGNOSIS — J44.9 COPD MIXED TYPE: ICD-10-CM

## 2023-11-09 DIAGNOSIS — R91.8 MULTIPLE PULMONARY NODULES: ICD-10-CM

## 2023-11-09 DIAGNOSIS — R05.3 CHRONIC COUGH: ICD-10-CM

## 2023-11-09 DIAGNOSIS — R91.8 INFILTRATE OF LOWER LOBE OF LEFT LUNG PRESENT ON IMAGING STUDY: ICD-10-CM

## 2023-11-09 PROCEDURE — 71250 CT THORAX DX C-: CPT

## 2023-11-14 ENCOUNTER — DOCUMENTATION (OUTPATIENT)
Dept: PULMONOLOGY | Facility: CLINIC | Age: 63
End: 2023-11-14
Payer: COMMERCIAL

## 2023-11-14 NOTE — PROGRESS NOTES
Patient underwent a repeat CT scan of the chest which I reviewed on PACS  Improvement in the left lower lobe opacity and stability in the other previously noted pulmonary nodules    I discussed these results with the patient on the phone    At the very least, we will get a follow-up CT scan of the chest in 1 year    I will plan to see her back in 6 months in the office

## 2024-01-02 ENCOUNTER — PATIENT OUTREACH (OUTPATIENT)
Dept: PULMONOLOGY | Facility: CLINIC | Age: 64
End: 2024-01-02
Payer: COMMERCIAL

## 2024-09-12 ENCOUNTER — APPOINTMENT (OUTPATIENT)
Dept: GENERAL RADIOLOGY | Facility: HOSPITAL | Age: 64
End: 2024-09-12
Payer: MEDICARE

## 2024-09-12 ENCOUNTER — HOSPITAL ENCOUNTER (EMERGENCY)
Facility: HOSPITAL | Age: 64
Discharge: HOME | End: 2024-09-12
Attending: STUDENT IN AN ORGANIZED HEALTH CARE EDUCATION/TRAINING PROGRAM
Payer: MEDICARE

## 2024-09-12 VITALS
RESPIRATION RATE: 18 BRPM | TEMPERATURE: 98.1 F | WEIGHT: 179 LBS | SYSTOLIC BLOOD PRESSURE: 133 MMHG | OXYGEN SATURATION: 95 % | BODY MASS INDEX: 31.71 KG/M2 | HEART RATE: 73 BPM | HEIGHT: 63 IN | DIASTOLIC BLOOD PRESSURE: 82 MMHG

## 2024-09-12 DIAGNOSIS — I16.0 HYPERTENSIVE URGENCY: Primary | ICD-10-CM

## 2024-09-12 LAB
ALBUMIN SERPL-MCNC: 4.5 G/DL (ref 3.5–5.2)
ALBUMIN/GLOB SERPL: 1.5 G/DL
ALP SERPL-CCNC: 86 U/L (ref 39–117)
ALT SERPL W P-5'-P-CCNC: 13 U/L (ref 1–33)
ANION GAP SERPL CALCULATED.3IONS-SCNC: 11.4 MMOL/L (ref 5–15)
AST SERPL-CCNC: 15 U/L (ref 1–32)
BASOPHILS # BLD AUTO: 0.11 10*3/MM3 (ref 0–0.2)
BASOPHILS NFR BLD AUTO: 1 % (ref 0–1.5)
BILIRUB SERPL-MCNC: 0.2 MG/DL (ref 0–1.2)
BUN SERPL-MCNC: 24 MG/DL (ref 8–23)
BUN/CREAT SERPL: 25 (ref 7–25)
CALCIUM SPEC-SCNC: 9.4 MG/DL (ref 8.6–10.5)
CHLORIDE SERPL-SCNC: 99 MMOL/L (ref 98–107)
CO2 SERPL-SCNC: 26.6 MMOL/L (ref 22–29)
CREAT SERPL-MCNC: 0.96 MG/DL (ref 0.57–1)
DEPRECATED RDW RBC AUTO: 45.6 FL (ref 37–54)
EGFRCR SERPLBLD CKD-EPI 2021: 65.8 ML/MIN/1.73
EOSINOPHIL # BLD AUTO: 0.52 10*3/MM3 (ref 0–0.4)
EOSINOPHIL NFR BLD AUTO: 4.8 % (ref 0.3–6.2)
ERYTHROCYTE [DISTWIDTH] IN BLOOD BY AUTOMATED COUNT: 13.5 % (ref 12.3–15.4)
GEN 5 2HR TROPONIN T REFLEX: <6 NG/L
GLOBULIN UR ELPH-MCNC: 3.1 GM/DL
GLUCOSE SERPL-MCNC: 121 MG/DL (ref 65–99)
HCT VFR BLD AUTO: 47.9 % (ref 34–46.6)
HGB BLD-MCNC: 15.6 G/DL (ref 12–15.9)
HOLD SPECIMEN: NORMAL
HOLD SPECIMEN: NORMAL
IMM GRANULOCYTES # BLD AUTO: 0.04 10*3/MM3 (ref 0–0.05)
IMM GRANULOCYTES NFR BLD AUTO: 0.4 % (ref 0–0.5)
LYMPHOCYTES # BLD AUTO: 3.17 10*3/MM3 (ref 0.7–3.1)
LYMPHOCYTES NFR BLD AUTO: 29.3 % (ref 19.6–45.3)
MCH RBC QN AUTO: 29.5 PG (ref 26.6–33)
MCHC RBC AUTO-ENTMCNC: 32.6 G/DL (ref 31.5–35.7)
MCV RBC AUTO: 90.7 FL (ref 79–97)
MONOCYTES # BLD AUTO: 0.71 10*3/MM3 (ref 0.1–0.9)
MONOCYTES NFR BLD AUTO: 6.6 % (ref 5–12)
NEUTROPHILS NFR BLD AUTO: 57.9 % (ref 42.7–76)
NEUTROPHILS NFR BLD AUTO: 6.26 10*3/MM3 (ref 1.7–7)
NRBC BLD AUTO-RTO: 0 /100 WBC (ref 0–0.2)
PLATELET # BLD AUTO: 364 10*3/MM3 (ref 140–450)
PMV BLD AUTO: 10.1 FL (ref 6–12)
POTASSIUM SERPL-SCNC: 3.9 MMOL/L (ref 3.5–5.2)
PROT SERPL-MCNC: 7.6 G/DL (ref 6–8.5)
QT INTERVAL: 388 MS
QTC INTERVAL: 442 MS
RBC # BLD AUTO: 5.28 10*6/MM3 (ref 3.77–5.28)
SODIUM SERPL-SCNC: 137 MMOL/L (ref 136–145)
TROPONIN T DELTA: NORMAL
TROPONIN T SERPL HS-MCNC: <6 NG/L
WBC NRBC COR # BLD AUTO: 10.81 10*3/MM3 (ref 3.4–10.8)
WHOLE BLOOD HOLD COAG: NORMAL
WHOLE BLOOD HOLD SPECIMEN: NORMAL

## 2024-09-12 PROCEDURE — 25010000002 ONDANSETRON PER 1 MG: Performed by: PHYSICIAN ASSISTANT

## 2024-09-12 PROCEDURE — 93005 ELECTROCARDIOGRAM TRACING: CPT | Performed by: STUDENT IN AN ORGANIZED HEALTH CARE EDUCATION/TRAINING PROGRAM

## 2024-09-12 PROCEDURE — 80053 COMPREHEN METABOLIC PANEL: CPT | Performed by: STUDENT IN AN ORGANIZED HEALTH CARE EDUCATION/TRAINING PROGRAM

## 2024-09-12 PROCEDURE — 85025 COMPLETE CBC W/AUTO DIFF WBC: CPT | Performed by: STUDENT IN AN ORGANIZED HEALTH CARE EDUCATION/TRAINING PROGRAM

## 2024-09-12 PROCEDURE — 96374 THER/PROPH/DIAG INJ IV PUSH: CPT

## 2024-09-12 PROCEDURE — 84484 ASSAY OF TROPONIN QUANT: CPT | Performed by: STUDENT IN AN ORGANIZED HEALTH CARE EDUCATION/TRAINING PROGRAM

## 2024-09-12 PROCEDURE — 71045 X-RAY EXAM CHEST 1 VIEW: CPT | Performed by: RADIOLOGY

## 2024-09-12 PROCEDURE — 99284 EMERGENCY DEPT VISIT MOD MDM: CPT

## 2024-09-12 PROCEDURE — 36415 COLL VENOUS BLD VENIPUNCTURE: CPT

## 2024-09-12 PROCEDURE — 71045 X-RAY EXAM CHEST 1 VIEW: CPT

## 2024-09-12 PROCEDURE — 25010000002 HYDRALAZINE PER 20 MG: Performed by: PHYSICIAN ASSISTANT

## 2024-09-12 PROCEDURE — 96375 TX/PRO/DX INJ NEW DRUG ADDON: CPT

## 2024-09-12 RX ORDER — SODIUM CHLORIDE 0.9 % (FLUSH) 0.9 %
10 SYRINGE (ML) INJECTION AS NEEDED
Status: DISCONTINUED | OUTPATIENT
Start: 2024-09-12 | End: 2024-09-12 | Stop reason: HOSPADM

## 2024-09-12 RX ORDER — HYDRALAZINE HYDROCHLORIDE 25 MG/1
25 TABLET, FILM COATED ORAL 2 TIMES DAILY PRN
Qty: 14 TABLET | Refills: 0 | Status: SHIPPED | OUTPATIENT
Start: 2024-09-12 | End: 2024-09-19

## 2024-09-12 RX ORDER — ONDANSETRON 2 MG/ML
4 INJECTION INTRAMUSCULAR; INTRAVENOUS ONCE
Status: COMPLETED | OUTPATIENT
Start: 2024-09-12 | End: 2024-09-12

## 2024-09-12 RX ORDER — HYDRALAZINE HYDROCHLORIDE 20 MG/ML
10 INJECTION INTRAMUSCULAR; INTRAVENOUS ONCE
Status: COMPLETED | OUTPATIENT
Start: 2024-09-12 | End: 2024-09-12

## 2024-09-12 RX ORDER — ASPIRIN 81 MG/1
324 TABLET, CHEWABLE ORAL ONCE
Status: COMPLETED | OUTPATIENT
Start: 2024-09-12 | End: 2024-09-12

## 2024-09-12 RX ADMIN — ASPIRIN 324 MG: 81 TABLET, CHEWABLE ORAL at 04:42

## 2024-09-12 RX ADMIN — ONDANSETRON 4 MG: 2 INJECTION INTRAMUSCULAR; INTRAVENOUS at 06:05

## 2024-09-12 RX ADMIN — HYDRALAZINE HYDROCHLORIDE 10 MG: 20 INJECTION INTRAMUSCULAR; INTRAVENOUS at 04:42

## 2024-09-12 NOTE — ED PROVIDER NOTES
Subjective   History of Present Illness  64-year-old female presents to the ER chief complaint of not feeling well.  Patient verbalizes that she is having issues with her blood pressure.  Did see her PCP in which she was started on losartan.  States she has been taking the losartan however it has not been bringing her blood pressure down.  Patient is complaining of some mild dizziness.        Review of Systems   Constitutional: Negative.  Negative for fever.   Respiratory: Negative.     Cardiovascular: Negative.  Negative for chest pain.   Gastrointestinal: Negative.  Negative for abdominal pain.   Endocrine: Negative.    Genitourinary: Negative.  Negative for dysuria.   Skin: Negative.    Neurological:  Positive for dizziness and headaches.   Psychiatric/Behavioral: Negative.     All other systems reviewed and are negative.      Past Medical History:   Diagnosis Date    COPD (chronic obstructive pulmonary disease)     Emphysema of lung     Glaucoma     Hypertension     Urinary tract infection        Allergies   Allergen Reactions    Sulfa Antibiotics Anaphylaxis    Penicillins Rash       Past Surgical History:   Procedure Laterality Date    AUGMENTATION MAMMAPLASTY  2007    redone in 2022 due to encapsulation    CHOLECYSTECTOMY      COLONOSCOPY N/A 6/19/2023    Procedure: COLONOSCOPY;  Surgeon: Cuong Rush MD;  Location: [facility];  Service: Gastroenterology;  Laterality: N/A;    EYE SURGERY      TONSILLECTOMY         Family History   Problem Relation Age of Onset    Dementia Mother     Hypertension Mother     Hypertension Father     Lung cancer Father     Hypertension Sister     COPD Sister     Breast cancer Paternal Aunt     Cancer Maternal Grandmother     Heart disease Maternal Grandfather     Cancer Paternal Grandfather        Social History     Socioeconomic History    Marital status: Single   Tobacco Use    Smoking status: Every Day     Current packs/day: 0.50     Average packs/day: 0.5  packs/day for 47.0 years (23.5 ttl pk-yrs)     Types: Cigarettes     Passive exposure: Current    Smokeless tobacco: Never   Vaping Use    Vaping status: Former   Substance and Sexual Activity    Alcohol use: Not Currently    Drug use: No    Sexual activity: Defer           Objective   Physical Exam  Vitals and nursing note reviewed.   Constitutional:       General: She is not in acute distress.     Appearance: She is well-developed. She is not diaphoretic.   HENT:      Head: Normocephalic and atraumatic.      Right Ear: External ear normal.      Left Ear: External ear normal.      Nose: Nose normal.   Eyes:      Conjunctiva/sclera: Conjunctivae normal.   Neck:      Vascular: No JVD.      Trachea: No tracheal deviation.   Cardiovascular:      Rate and Rhythm: Normal rate and regular rhythm.      Heart sounds: Normal heart sounds. No murmur heard.  Pulmonary:      Effort: Pulmonary effort is normal. No respiratory distress.      Breath sounds: Normal breath sounds. No wheezing.   Abdominal:      Palpations: Abdomen is soft.      Tenderness: There is no abdominal tenderness.   Musculoskeletal:         General: No deformity. Normal range of motion.      Cervical back: Normal range of motion and neck supple.   Skin:     General: Skin is warm and dry.      Coloration: Skin is not pale.      Findings: No erythema or rash.   Neurological:      Mental Status: She is alert and oriented to person, place, and time.      Cranial Nerves: No cranial nerve deficit.   Psychiatric:         Behavior: Behavior normal.         Thought Content: Thought content normal.         Procedures           ED Course  ED Course as of 09/12/24 0635   Thu Sep 12, 2024   0549 XR chest rad interpreted:  1.  Mild enlarged heart size.  2.  Mild emphysematous changes in the upper lobes.  3.  No edema or pneumonia.  4.  No pleural effusion or pneumothorax.  5.  Slightly elevated left hemidiaphragm.   [RB]      ED Course User Index  [RB] Romaine Ching II,  PA                                             Medical Decision Making  Amount and/or Complexity of Data Reviewed  Labs: ordered.  Radiology: ordered.  ECG/medicine tests: ordered.    Risk  OTC drugs.  Prescription drug management.        Final diagnoses:   Hypertensive urgency       ED Disposition  ED Disposition       ED Disposition   Discharge    Condition   Stable    Comment   --               Luís Coon MD  [address] [place] [address]  [phone]    Schedule an appointment as soon as possible for a visit            Medication List        New Prescriptions      hydrALAZINE 25 MG tablet  Commonly known as: APRESOLINE  Take 1 tablet by mouth 2 (Two) Times a Day As Needed (Elevated blood pressure, blood pressure greater than 160/90) for up to 7 days.               Where to Get Your Medications        These medications were sent to [id number] - [place], [place] - [address] [facility] [phone] [facility] [phone] FX  [address] [place] [address]      Phone: [phone]   hydrALAZINE 25 MG tablet            Romaine Ching II, PA  09/12/24 7080

## 2024-11-05 DIAGNOSIS — M25.511 RIGHT SHOULDER PAIN, UNSPECIFIED CHRONICITY: Primary | ICD-10-CM

## 2024-11-25 ENCOUNTER — OFFICE VISIT (OUTPATIENT)
Dept: ORTHOPEDIC SURGERY | Facility: CLINIC | Age: 64
End: 2024-11-25
Payer: MEDICARE

## 2024-11-25 ENCOUNTER — HOSPITAL ENCOUNTER (OUTPATIENT)
Dept: GENERAL RADIOLOGY | Facility: HOSPITAL | Age: 64
Discharge: HOME | End: 2024-11-25
Admitting: ORTHOPAEDIC SURGERY
Payer: MEDICARE

## 2024-11-25 VITALS — BODY MASS INDEX: 31.72 KG/M2 | WEIGHT: 179.01 LBS | HEIGHT: 63 IN

## 2024-11-25 DIAGNOSIS — R20.0 NUMBNESS AND TINGLING IN LEFT HAND: ICD-10-CM

## 2024-11-25 DIAGNOSIS — R20.2 NUMBNESS AND TINGLING IN LEFT HAND: ICD-10-CM

## 2024-11-25 DIAGNOSIS — M54.2 NECK PAIN: Primary | ICD-10-CM

## 2024-11-25 DIAGNOSIS — M25.511 RIGHT SHOULDER PAIN, UNSPECIFIED CHRONICITY: ICD-10-CM

## 2024-11-25 PROCEDURE — 73030 X-RAY EXAM OF SHOULDER: CPT

## 2024-11-25 PROCEDURE — 99204 OFFICE O/P NEW MOD 45 MIN: CPT | Performed by: ORTHOPAEDIC SURGERY

## 2024-11-25 PROCEDURE — 72040 X-RAY EXAM NECK SPINE 2-3 VW: CPT | Performed by: RADIOLOGY

## 2024-11-25 PROCEDURE — 73030 X-RAY EXAM OF SHOULDER: CPT | Performed by: RADIOLOGY

## 2024-11-25 PROCEDURE — 72040 X-RAY EXAM NECK SPINE 2-3 VW: CPT

## 2024-11-25 NOTE — PROGRESS NOTES
New Patient Visit      Patient: Mandi Baldwin  YOB: 1960  Date of Encounter: 11/25/2024        Chief Complaint:   Chief Complaint   Patient presents with    Left Shoulder - Pain, Initial Evaluation           HPI:   Mandi Baldwin, 64 y.o. female, referred by Luís Coon MD presents for evaluation of left posterior shoulder pain which developed approximately 1 month ago she reports no history of trauma she simply woke up with shoulder pain.  In addition to her posterior shoulder pain she also acknowledges numbness of her third finger it initially began 2nd through 4th fingers but now has improved.  She describes difficulty sleeping at night on her left side but also has problems sleeping on her right side she experiences left posterior shoulder pain with both.  Her past medical history includes stage III COPD emphysema glaucoma and continued tobacco use.        Active Problem List:  Patient Active Problem List   Diagnosis    Encounter for screening for malignant neoplasm of colon    Multiple pulmonary nodules (Two stable right sided nodules)    Lingular infiltrate    Stage III (Severe) COPD    Tobacco abuse    RAMIRES (dyspnea on exertion)    Chronic cough           Past Medical History:  Past Medical History:   Diagnosis Date    COPD (chronic obstructive pulmonary disease)     Emphysema of lung     Glaucoma     Hypertension     Urinary tract infection            Past Surgical History:  Past Surgical History:   Procedure Laterality Date    AUGMENTATION MAMMAPLASTY  2007    redone in 2022 due to encapsulation    CHOLECYSTECTOMY      COLONOSCOPY N/A 6/19/2023    Procedure: COLONOSCOPY;  Surgeon: Cuong Rush MD;  Location: [facility];  Service: Gastroenterology;  Laterality: N/A;    EYE SURGERY      TONSILLECTOMY             Family History:  Family History   Problem Relation Age of Onset    Dementia Mother     Hypertension Mother     Hypertension Father     Lung cancer Father      Hypertension Sister     COPD Sister     Breast cancer Paternal Aunt     Cancer Maternal Grandmother     Heart disease Maternal Grandfather     Cancer Paternal Grandfather          Social History:  Social History     Socioeconomic History    Marital status: Single   Tobacco Use    Smoking status: Every Day     Current packs/day: 0.50     Average packs/day: 0.5 packs/day for 47.0 years (23.5 ttl pk-yrs)     Types: Cigarettes     Passive exposure: Current    Smokeless tobacco: Never   Vaping Use    Vaping status: Former   Substance and Sexual Activity    Alcohol use: Not Currently    Drug use: No    Sexual activity: Defer     Body mass index is 31.72 kg/m².  BMI is >= 30 and <35. (Class 1 Obesity). The following options were offered after discussion;: weight loss educational material (shared in after visit summary)      Medications:  Current Outpatient Medications   Medication Sig Dispense Refill    albuterol (PROVENTIL HFA;VENTOLIN HFA) 108 (90 Base) MCG/ACT inhaler Inhale 2 puffs Every 4 (Four) Hours As Needed for Wheezing. 1 inhaler 0    bisoprolol-hydrochlorothiazide (ZIAC) 5-6.25 MG per tablet Take 1 tablet by mouth Daily.      buPROPion XL (WELLBUTRIN XL) 300 MG 24 hr tablet Take 1 tablet by mouth Daily.      citalopram (CeleXA) 20 MG tablet Take 1 tablet by mouth Daily.      Timolol Maleate, Once-Daily, 0.5 % solution INSTILL 1 DROP INTO BOTH EYES EVERY MORNING      Xiidra 5 % ophthalmic solution Administer 1 drop to both eyes 1 (One) Time.      budesonide-formoterol (SYMBICORT) 160-4.5 MCG/ACT inhaler Inhale 2 puffs 2 (Two) Times a Day. 1 inhaler 0    hydrALAZINE (APRESOLINE) 25 MG tablet Take 1 tablet by mouth 2 (Two) Times a Day As Needed (Elevated blood pressure, blood pressure greater than 160/90) for up to 7 days. 14 tablet 0    nicotine (NICODERM CQ) 21 MG/24HR patch APPLY 1 PATCH TOPICALLY TO THE SKIN IN THE MORNING (Patient not taking: Reported on 11/25/2024)      ranolazine (RANEXA) 500 MG 12 hr tablet  TAKE 1 TABLET BY MOUTH EVERY MORNING AND 1 TABLET BY MOUTH AT BEDTIME (Patient not taking: Reported on 11/25/2024)       No current facility-administered medications for this visit.         Allergies:  Allergies   Allergen Reactions    Sulfa Antibiotics Anaphylaxis    Penicillins Rash         Review of Systems:   Review of Systems   Constitutional:  Positive for activity change. Negative for chills, fatigue and fever.   HENT:  Positive for mouth sores. Negative for congestion, ear pain, facial swelling, sore throat, trouble swallowing and voice change.    Eyes:  Negative for pain, discharge, redness and visual disturbance.   Respiratory:  Positive for cough. Negative for apnea, choking, chest tightness, shortness of breath, wheezing and stridor.    Cardiovascular: Negative.  Negative for chest pain, palpitations and leg swelling.   Gastrointestinal:  Positive for nausea. Negative for abdominal distention, abdominal pain, blood in stool and vomiting.   Endocrine: Negative.  Negative for cold intolerance, heat intolerance, polydipsia and polyphagia.   Genitourinary: Negative.  Negative for difficulty urinating, dysuria, flank pain, frequency and hematuria.   Musculoskeletal:  Positive for arthralgias and neck pain.   Skin: Negative.  Negative for color change, pallor, rash and wound.   Allergic/Immunologic: Negative.  Negative for environmental allergies, food allergies and immunocompromised state.   Neurological:  Positive for numbness. Negative for dizziness, tremors, seizures, syncope, speech difficulty, weakness, light-headedness and headaches.   Hematological:  Negative for adenopathy. Bruises/bleeds easily.   Psychiatric/Behavioral:  Positive for dysphoric mood. Negative for behavioral problems, confusion, self-injury, sleep disturbance and suicidal ideas. The patient is not nervous/anxious.          Physical Exam:   Physical Exam  GENERAL: 64 y.o. female, alert and oriented X 3 in no acute distress.   Visit  "Vitals  Ht 160 cm (62.99\")   Wt 81.2 kg (179 lb 0.2 oz)   BMI 31.72 kg/m²       GENERAL APPEARANCE: Awake, alert & oriented, in no acute distress and well developed, well nourished.   PSYCH: Normal mood and affect  LUNGS: Breathing nonlabored, no wheezing  EYES: Sclera anicteric, pupils equal  CARDIOVASCULAR: Palpable pulses. Capillary refill less than 2 seconds  INTEGUMENTARY: Skin intact, co clubbing, cyanosis  NEUROLOGIC: Normal Sensation  MUSCULOSKELETAL:  Orthopedic Examination: Cervical spine reveals mild limitation of flexion and extension with moderate pain also mildly limited lateral rotation and lateral bending to the left.  Left shoulder evaluation demonstrates full mobility with no discomfort at extremes of flexion Neer's and Nunn signs are negative Sandra's test is negative AC joint nontender no increased pain with crossarm adduction bicipital groove nontender Speed's Test negative.  Neurovascular exam is remarkable for decreased sensation to left third finger.        Radiology/Labs:     XR Spine Cervical 2 or 3 View    Result Date: 11/25/2024    Degenerative changes cervical spine as described.  This report was finalized on 11/25/2024 1:52 PM by Dr. Asim Herrera MD.      XR Shoulder 2+ View Left    Result Date: 11/25/2024    Unremarkable left shoulder x-rays.  This report was finalized on 11/25/2024 1:20 PM by Dr. Asim Herrera MD.           Radiographs shoulder unremarkable by report and by my review.          Assessment & Plan:   64 y.o. female presents 1 month history of left shoulder pain with radiographs obtained today of cervical spine showing moderate degenerative disc disease at the C6-C7 level which corresponds with her left third finger numbness.  I think her findings of pain posterior left shoulder and diminished sensation left hand are related to degenerative disc disease cervical spine with radiculopathy.  We discussed options we will request MRI of cervical spine and EMG nerve " conduction studies we will see her back once completed.        ICD-10-CM ICD-9-CM   1. Neck pain  M54.2 723.1   2. Numbness and tingling in left hand  R20.0 782.0    R20.2                Cc:   Luís Coon MD                This document has been electronically signed by Keo Cifuentes MD   November 25, 2024 16:25 EST

## 2024-12-30 ENCOUNTER — TELEPHONE (OUTPATIENT)
Dept: ORTHOPEDIC SURGERY | Facility: CLINIC | Age: 64
End: 2024-12-30
Payer: COMMERCIAL

## 2025-01-08 ENCOUNTER — PROCEDURE VISIT (OUTPATIENT)
Dept: ORTHOPEDIC SURGERY | Facility: CLINIC | Age: 65
End: 2025-01-08
Payer: MEDICARE

## 2025-01-08 DIAGNOSIS — G56.02 CARPAL TUNNEL SYNDROME ON LEFT: Primary | ICD-10-CM

## 2025-01-08 DIAGNOSIS — R20.0 NUMBNESS AND TINGLING IN LEFT HAND: ICD-10-CM

## 2025-01-08 DIAGNOSIS — M54.12 BRACHIAL NEURITIS: ICD-10-CM

## 2025-01-08 DIAGNOSIS — M79.602 LEFT ARM PAIN: ICD-10-CM

## 2025-01-08 DIAGNOSIS — M54.2 NECK PAIN: ICD-10-CM

## 2025-01-08 DIAGNOSIS — R20.2 NUMBNESS AND TINGLING IN LEFT HAND: ICD-10-CM

## 2025-01-15 ENCOUNTER — OFFICE VISIT (OUTPATIENT)
Dept: ORTHOPEDIC SURGERY | Facility: CLINIC | Age: 65
End: 2025-01-15
Payer: MEDICARE

## 2025-01-15 VITALS — WEIGHT: 179.01 LBS | HEIGHT: 63 IN | BODY MASS INDEX: 31.72 KG/M2

## 2025-01-15 DIAGNOSIS — R20.2 NUMBNESS AND TINGLING IN LEFT HAND: ICD-10-CM

## 2025-01-15 DIAGNOSIS — M50.30 DDD (DEGENERATIVE DISC DISEASE), CERVICAL: ICD-10-CM

## 2025-01-15 DIAGNOSIS — R20.0 NUMBNESS AND TINGLING IN LEFT HAND: ICD-10-CM

## 2025-01-15 DIAGNOSIS — R94.130 ABNORMAL NERVE CONDUCTION STUDIES: Primary | ICD-10-CM

## 2025-01-15 DIAGNOSIS — M54.2 NECK PAIN: ICD-10-CM

## 2025-01-15 RX ORDER — HYDROCODONE BITARTRATE AND ACETAMINOPHEN 5; 325 MG/1; MG/1
1 TABLET ORAL
COMMUNITY
Start: 2025-01-07

## 2025-01-15 RX ORDER — ONDANSETRON 4 MG/1
4 TABLET, FILM COATED ORAL EVERY 8 HOURS PRN
COMMUNITY

## 2025-01-15 NOTE — PROGRESS NOTES
Follow-up Visit      Patient: Mandi Baldwin  YOB: 1960  Date of Encounter: 01/15/2025      Chief Complaint:   Chief Complaint   Patient presents with    Cervical Spine - Pain     EMG/NCV review           HPI:   Mandi Baldwin, 64 y.o. female, presents in follow-up left shoulder pain numbness of third finger left hand mild numbness second and fourth fingers.  She returns for review of MRI cervical spine and EMG nerve conduction studies.  Her symptoms remain unchanged.  She reports recent nausea.  Medical history includes COPD eczema glaucoma.        Active Problem List:  Patient Active Problem List   Diagnosis    Encounter for screening for malignant neoplasm of colon    Multiple pulmonary nodules (Two stable right sided nodules)    Lingular infiltrate    Stage III (Severe) COPD    Tobacco abuse    RAMIRES (dyspnea on exertion)    Chronic cough           Past Medical History:  Past Medical History:   Diagnosis Date    COPD (chronic obstructive pulmonary disease)     Emphysema of lung     Glaucoma     Hypertension     Urinary tract infection            Past Surgical History:  Past Surgical History:   Procedure Laterality Date    AUGMENTATION MAMMAPLASTY  2007    redone in 2022 due to encapsulation    CHOLECYSTECTOMY      COLONOSCOPY N/A 6/19/2023    Procedure: COLONOSCOPY;  Surgeon: Cuong Rush MD;  Location: [facility];  Service: Gastroenterology;  Laterality: N/A;    EYE SURGERY      TONSILLECTOMY             Family History:  Family History   Problem Relation Age of Onset    Dementia Mother     Hypertension Mother     Hypertension Father     Lung cancer Father     Hypertension Sister     COPD Sister     Breast cancer Paternal Aunt     Cancer Maternal Grandmother     Heart disease Maternal Grandfather     Cancer Paternal Grandfather            Social History:  Social History     Socioeconomic History    Marital status: Single   Tobacco Use    Smoking status: Every Day     Current  "packs/day: 0.50     Average packs/day: 0.5 packs/day for 47.0 years (23.5 ttl pk-yrs)     Types: Cigarettes     Passive exposure: Current    Smokeless tobacco: Never   Vaping Use    Vaping status: Former   Substance and Sexual Activity    Alcohol use: Not Currently    Drug use: No    Sexual activity: Defer     Body mass index is 31.72 kg/m².        Medications:  Current Outpatient Medications   Medication Sig Dispense Refill    albuterol (PROVENTIL HFA;VENTOLIN HFA) 108 (90 Base) MCG/ACT inhaler Inhale 2 puffs Every 4 (Four) Hours As Needed for Wheezing. 1 inhaler 0    bisoprolol-hydrochlorothiazide (ZIAC) 5-6.25 MG per tablet Take 1 tablet by mouth Daily.      budesonide-formoterol (SYMBICORT) 160-4.5 MCG/ACT inhaler Inhale 2 puffs 2 (Two) Times a Day. 1 inhaler 0    buPROPion XL (WELLBUTRIN XL) 300 MG 24 hr tablet Take 1 tablet by mouth Daily.      citalopram (CeleXA) 20 MG tablet Take 1 tablet by mouth Daily.      HYDROcodone-acetaminophen (NORCO) 5-325 MG per tablet Take 1 tablet by mouth.      nicotine (NICODERM CQ) 21 MG/24HR patch       ondansetron (ZOFRAN) 4 MG tablet Take 1 tablet by mouth Every 8 (Eight) Hours As Needed. for nausea      ranolazine (RANEXA) 500 MG 12 hr tablet       Timolol Maleate, Once-Daily, 0.5 % solution INSTILL 1 DROP INTO BOTH EYES EVERY MORNING      Xiidra 5 % ophthalmic solution Administer 1 drop to both eyes 1 (One) Time.      hydrALAZINE (APRESOLINE) 25 MG tablet Take 1 tablet by mouth 2 (Two) Times a Day As Needed (Elevated blood pressure, blood pressure greater than 160/90) for up to 7 days. 14 tablet 0     No current facility-administered medications for this visit.           Allergies:  Allergies   Allergen Reactions    Sulfa Antibiotics Anaphylaxis    Penicillins Rash           Physical Exam:   Physical Exam  GENERAL: 64 y.o. female, alert and oriented X 3 in no acute distress.   Visit Vitals  Ht 160 cm (62.99\")   Wt 81.2 kg (179 lb 0.2 oz)   BMI 31.72 kg/m² "           Musculoskeletal Examination:   Cervical spine reveals mild limitation of motion with flexion extension lateral rotation and lateral bending greatest discomfort with lateral rotation and bending to the left.    Evaluation left hand reveals diminished sensation to the tip of the third finger.  This worsens and increases to include second and fourth fingers with Phalen's maneuver.  No thenar atrophy.        Radiology/Labs:       MRI Cervical Spine          EMG/NCS:      Assessment & Plan:   64 y.o. female presents follow-up with left posterior shoulder pain and numbness of her third finger with nerve conduction studies identifying mild to moderate carpal tunnel syndrome but also demonstrates suspected pathology C5-C6 nerve root left.    MRI confirms multilevel degenerative disc disease with involvement C4-C7.  Discussed options and she is referred for neurosurgery evaluation before considering carpal tunnel release.  She is suspected of having greater pathology C-spine than carpal tunnel syndrome.    ICD-10-CM ICD-9-CM   1. Abnormal nerve conduction studies  R94.130 794.10   2. Numbness and tingling in left hand  R20.0 782.0    R20.2    3. Neck pain  M54.2 723.1   4. DDD (degenerative disc disease), cervical  M50.30 722.4           Cc:   Luís Coon MD              This document has been electronically signed by Keo Cifuentes MD   January 21, 2025 20:46 EST

## 2025-02-06 ENCOUNTER — OFFICE VISIT (OUTPATIENT)
Dept: NEUROSURGERY | Facility: CLINIC | Age: 65
End: 2025-02-06
Payer: MEDICARE

## 2025-02-06 VITALS — HEIGHT: 62 IN | BODY MASS INDEX: 30.36 KG/M2 | WEIGHT: 165 LBS | TEMPERATURE: 97.1 F

## 2025-02-06 DIAGNOSIS — M50.30 DDD (DEGENERATIVE DISC DISEASE), CERVICAL: Primary | ICD-10-CM

## 2025-02-06 NOTE — PROGRESS NOTES
Patient: Mandi Baldwin  : 1960    Primary Care Provider: Luís Coon MD    Requesting Provider: As above      Chief Complaint: Neck Pain and Arm Pain (Left Arm Pain/)      History of Present Illness: This is a 64 y.o. female who presents with 3 months of neck and left arm pain.  The patient denies an initiating event, but states she woke up from sleeping with pain in her neck but also pain that radiated down her arm to the third and fourth digit.  She describes it as a tingling type pain.  She feels like the left arm is weaker.  She denies any right upper extremity symptoms.  She has not had any formal physical therapy or injections in her neck.  She does feel like over time her symptoms have improved modestly.  She states she is roughly 50% better than she was initially.    PMHX  Allergies:  Allergies   Allergen Reactions    Sulfa Antibiotics Anaphylaxis    Penicillins Rash     Medications    Current Outpatient Medications:     albuterol (PROVENTIL HFA;VENTOLIN HFA) 108 (90 Base) MCG/ACT inhaler, Inhale 2 puffs Every 4 (Four) Hours As Needed for Wheezing., Disp: 1 inhaler, Rfl: 0    bisoprolol-hydrochlorothiazide (ZIAC) 5-6.25 MG per tablet, Take 1 tablet by mouth Daily., Disp: , Rfl:     budesonide-formoterol (SYMBICORT) 160-4.5 MCG/ACT inhaler, Inhale 2 puffs 2 (Two) Times a Day., Disp: 1 inhaler, Rfl: 0    buPROPion XL (WELLBUTRIN XL) 300 MG 24 hr tablet, Take 1 tablet by mouth Daily., Disp: , Rfl:     citalopram (CeleXA) 20 MG tablet, Take 1 tablet by mouth Daily., Disp: , Rfl:     HYDROcodone-acetaminophen (NORCO) 5-325 MG per tablet, Take 1 tablet by mouth., Disp: , Rfl:     ondansetron (ZOFRAN) 4 MG tablet, Take 1 tablet by mouth Every 8 (Eight) Hours As Needed. for nausea, Disp: , Rfl:     Timolol Maleate, Once-Daily, 0.5 % solution, INSTILL 1 DROP INTO BOTH EYES EVERY MORNING, Disp: , Rfl:     Xiidra 5 % ophthalmic solution, Administer 1 drop to both eyes 1 (One) Time., Disp: , Rfl:      hydrALAZINE (APRESOLINE) 25 MG tablet, Take 1 tablet by mouth 2 (Two) Times a Day As Needed (Elevated blood pressure, blood pressure greater than 160/90) for up to 7 days., Disp: 14 tablet, Rfl: 0    nicotine (NICODERM CQ) 21 MG/24HR patch, , Disp: , Rfl:   Past Medical History:  Past Medical History:   Diagnosis Date    Cervical disc disorder     COPD (chronic obstructive pulmonary disease)     Emphysema of lung     Glaucoma     Hypertension     Urinary tract infection      Past Surgical History:  Past Surgical History:   Procedure Laterality Date    AUGMENTATION MAMMAPLASTY  2007    redone in 2022 due to encapsulation    CHOLECYSTECTOMY      COLONOSCOPY N/A 6/19/2023    Procedure: COLONOSCOPY;  Surgeon: Cuong Rush MD;  Location: [facility];  Service: Gastroenterology;  Laterality: N/A;    EYE SURGERY      TONSILLECTOMY       Social Hx:  Social History     Tobacco Use    Smoking status: Every Day     Current packs/day: 0.50     Average packs/day: 0.5 packs/day for 47.0 years (23.5 ttl pk-yrs)     Types: Cigarettes     Passive exposure: Current    Smokeless tobacco: Never   Vaping Use    Vaping status: Former   Substance Use Topics    Alcohol use: Not Currently    Drug use: No     Family Hx:  Family History   Problem Relation Age of Onset    Dementia Mother     Hypertension Mother     Hypertension Father     Lung cancer Father     Hypertension Sister     COPD Sister     Breast cancer Paternal Aunt     Cancer Maternal Grandmother     Heart disease Maternal Grandfather     Cancer Paternal Grandfather      Review of Systems:        Review of Systems   Constitutional:  Negative for activity change, appetite change, chills, diaphoresis, fatigue, fever and unexpected weight change.   HENT:  Negative for congestion, dental problem, drooling, ear discharge, ear pain, facial swelling, hearing loss, mouth sores, nosebleeds, postnasal drip, rhinorrhea, sinus pressure, sinus pain, sneezing, sore throat,  "tinnitus, trouble swallowing and voice change.    Eyes:  Negative for photophobia, pain, discharge, redness, itching and visual disturbance.   Respiratory:  Negative for apnea, cough, choking, chest tightness, shortness of breath, wheezing and stridor.    Cardiovascular:  Negative for chest pain, palpitations and leg swelling.   Gastrointestinal:  Negative for abdominal distention, abdominal pain, anal bleeding, blood in stool, constipation, diarrhea, nausea, rectal pain and vomiting.   Endocrine: Negative for cold intolerance, heat intolerance, polydipsia, polyphagia and polyuria.   Genitourinary:  Negative for decreased urine volume, difficulty urinating, dyspareunia, dysuria, enuresis, flank pain, frequency, genital sores, hematuria, menstrual problem, pelvic pain, urgency, vaginal bleeding, vaginal discharge and vaginal pain.   Musculoskeletal:  Positive for neck pain and neck stiffness. Negative for arthralgias, back pain, gait problem, joint swelling and myalgias.   Skin:  Negative for color change, pallor, rash and wound.   Allergic/Immunologic: Negative for environmental allergies, food allergies and immunocompromised state.   Neurological:  Positive for weakness and numbness. Negative for dizziness, tremors, seizures, syncope, facial asymmetry, speech difficulty, light-headedness and headaches.   Hematological:  Negative for adenopathy. Does not bruise/bleed easily.   Psychiatric/Behavioral:  Negative for agitation, behavioral problems, confusion, decreased concentration, dysphoric mood, hallucinations, self-injury, sleep disturbance and suicidal ideas. The patient is not nervous/anxious and is not hyperactive.         Physical Exam:   Temp 97.1 °F (36.2 °C) (Infrared)   Ht 157.5 cm (62\")   Wt 74.8 kg (165 lb)   BMI 30.18 kg/m²   Awake, alert and oriented x 3  Speech f/c  Opens eyes spont  Pupils 3 mm rx bilaterally  Extraocular muscles intact bilaterally  Normal sensation to light touch in all 3 " distributions of CN V bilaterally  Face symmetric bilaterally  Tongue midline  5/5 in all 4 ext with exception of left hand  which is 4+  No Mamadou's bilaterally    Diagnostic Studies:  All neurological imaging studies were independently reviewed unless stated otherwise    Assessment/Plan:  This is a 64 y.o. female presenting with 3 months of neck and left arm pain.  In reviewing the patient's cervical MRI, there is mild degenerative changes at multiple levels, but at C6-7, she does have a disc bulge which results in bilateral neuroforaminal narrowing.  Additionally, she has undergone an EMG which shows a C6-7 radiculopathy.  I think based upon her radiographic findings, EMG and symptoms, it is the C6-7 level which is causing her left arm pain.  The patient has had some improvement with time but does continue to have significant symptoms.  I am going to prescribe her physical therapy.  We will have her follow-up with me in 2 months to see how she is doing.  If she continues to have significant left upper extremity symptoms, I think she would benefit from a C6-7 ACDF.    Diagnoses and all orders for this visit:    1. DDD (degenerative disc disease), cervical (Primary)  -     Ambulatory Referral to Physical Therapy for Evaluation & Treatment      Mandi GISELA Baldwin  reports that she has been smoking cigarettes. She has a 23.5 pack-year smoking history. She has been exposed to tobacco smoke. She has never used smokeless tobacco.       Josué Noriega MD  02/06/25  13:48 EST

## 2025-02-24 ENCOUNTER — OFFICE VISIT (OUTPATIENT)
Dept: PULMONOLOGY | Facility: CLINIC | Age: 65
End: 2025-02-24
Payer: MEDICARE

## 2025-02-24 VITALS
WEIGHT: 153.4 LBS | BODY MASS INDEX: 27.18 KG/M2 | HEIGHT: 63 IN | TEMPERATURE: 99.1 F | SYSTOLIC BLOOD PRESSURE: 118 MMHG | DIASTOLIC BLOOD PRESSURE: 62 MMHG | OXYGEN SATURATION: 97 % | HEART RATE: 73 BPM

## 2025-02-24 DIAGNOSIS — J44.9 COPD MIXED TYPE: ICD-10-CM

## 2025-02-24 DIAGNOSIS — R05.3 CHRONIC COUGH: ICD-10-CM

## 2025-02-24 DIAGNOSIS — R91.8 MULTIPLE PULMONARY NODULES: Primary | ICD-10-CM

## 2025-02-24 DIAGNOSIS — Z87.891 PERSONAL HISTORY OF NICOTINE DEPENDENCE: ICD-10-CM

## 2025-02-24 DIAGNOSIS — R06.09 DOE (DYSPNEA ON EXERTION): ICD-10-CM

## 2025-02-24 NOTE — PROGRESS NOTES
PULMONARY  NOTE    Chief Complaint     Abnormal chest CT, tobacco abuse, COPD, chronic cough, dyspnea on exertion    History of Present Illness     64-year-old female returns today for follow-up  I last saw her 10/23/2023    Originally referred for abnormal chest imaging  Her plan was to follow with serial chest imaging but she just got 1 follow-up CT scan before being lost to follow-up  She returns today for follow-up having received a letter urging her to come in for LDCT screening    She has ongoing tobacco abuse  She is cutting back    She has a diagnosis of COPD  She remains on Symbicort with albuterol    She has dyspnea on exertion and chronic cough  No recent exacerbation of symptoms    She was at [facility] back in October and had a CT angiogram of the chest at that time as noted below    Patient Active Problem List   Diagnosis    Encounter for screening for malignant neoplasm of colon    Multiple pulmonary nodules (Two stable right sided nodules)    Lingular infiltrate    Stage III (Severe) COPD    Tobacco abuse    RAMIRES (dyspnea on exertion)    Chronic cough      Allergies   Allergen Reactions    Sulfa Antibiotics Anaphylaxis    Penicillins Rash       Current Outpatient Medications:     albuterol (PROVENTIL HFA;VENTOLIN HFA) 108 (90 Base) MCG/ACT inhaler, Inhale 2 puffs Every 4 (Four) Hours As Needed for Wheezing., Disp: 1 inhaler, Rfl: 0    bisoprolol-hydrochlorothiazide (ZIAC) 5-6.25 MG per tablet, Take 1 tablet by mouth Daily., Disp: , Rfl:     budesonide-formoterol (SYMBICORT) 160-4.5 MCG/ACT inhaler, Inhale 2 puffs 2 (Two) Times a Day., Disp: 1 inhaler, Rfl: 0    buPROPion XL (WELLBUTRIN XL) 300 MG 24 hr tablet, Take 1 tablet by mouth Daily., Disp: , Rfl:     citalopram (CeleXA) 20 MG tablet, Take 1 tablet by mouth Daily., Disp: , Rfl:     hydrALAZINE (APRESOLINE) 25 MG tablet, Take 1 tablet by mouth 2 (Two) Times a Day As Needed (Elevated blood pressure, blood pressure greater than 160/90)  "for up to 7 days., Disp: 14 tablet, Rfl: 0    HYDROcodone-acetaminophen (NORCO) 5-325 MG per tablet, Take 1 tablet by mouth., Disp: , Rfl:     ondansetron (ZOFRAN) 4 MG tablet, Take 1 tablet by mouth Every 8 (Eight) Hours As Needed. for nausea, Disp: , Rfl:     Timolol Maleate, Once-Daily, 0.5 % solution, INSTILL 1 DROP INTO BOTH EYES EVERY MORNING, Disp: , Rfl:     Xiidra 5 % ophthalmic solution, Administer 1 drop to both eyes 1 (One) Time., Disp: , Rfl:     nicotine (NICODERM CQ) 21 MG/24HR patch, , Disp: , Rfl:   MEDICATION LIST AND ALLERGIES REVIEWED.    Family History   Problem Relation Age of Onset    Dementia Mother     Hypertension Mother     Hypertension Father     Lung cancer Father     Hypertension Sister     COPD Sister     Breast cancer Paternal Aunt     Cancer Maternal Grandmother     Heart disease Maternal Grandfather     Cancer Paternal Grandfather      Social History     Tobacco Use    Smoking status: Every Day     Current packs/day: 0.50     Average packs/day: 0.5 packs/day for 47.0 years (23.5 ttl pk-yrs)     Types: Cigarettes     Passive exposure: Current    Smokeless tobacco: Never   Vaping Use    Vaping status: Former   Substance Use Topics    Alcohol use: Not Currently    Drug use: No     Social History     Social History Narrative    Single    Disabled    Ongoing tobacco abuse without a smoking cessation date set,     FAMILY AND SOCIAL HISTORY REVIEWED.    Review of Systems  IF PRESENT REFER TO SCANNED ROS SHEET FROM SAME DATE  OTHERWISE ROS OBTAINED AND NON-CONTRIBUTORY OVER HPI.    /62   Pulse 73   Temp 99.1 °F (37.3 °C)   Ht 160 cm (63\")   Wt 69.6 kg (153 lb 6.4 oz)   SpO2 97%   BMI 27.17 kg/m²   Physical Exam  Vitals and nursing note reviewed.   Constitutional:       General: She is not in acute distress.     Appearance: She is well-developed. She is not diaphoretic.   HENT:      Head: Normocephalic and atraumatic.   Neck:      Thyroid: No thyromegaly.   Cardiovascular:      " Rate and Rhythm: Normal rate and regular rhythm.      Heart sounds: Normal heart sounds. No murmur heard.  Pulmonary:      Effort: Pulmonary effort is normal.      Breath sounds: Normal breath sounds. No stridor.   Lymphadenopathy:      Cervical: No cervical adenopathy.      Upper Body:      Right upper body: No supraclavicular or epitrochlear adenopathy.      Left upper body: No supraclavicular or epitrochlear adenopathy.   Skin:     General: Skin is warm and dry.   Neurological:      Mental Status: She is alert and oriented to person, place, and time.   Psychiatric:         Behavior: Behavior normal.         Results     CT scan of the chest done at [facility] from 10/2/2024 reviewed on PACS  This was a CT angiogram with no pulmonary emboli  No consolidation or effusions    Immunization History   Administered Date(s) Administered    31-influenza Vac Quardvalent Preservativ 09/27/2019    COVID-19 (PFIZER) Purple Cap Monovalent 12/19/2020, 01/09/2021, 09/27/2021    Fluzone (or Fluarix & Flulaval for VFC) >6mos 10/01/2020, 11/08/2022, 10/23/2023    Fluzone High-Dose 65+YRS 10/03/2024    Tdap 09/27/2019, 04/15/2020     Problem List       ICD-10-CM ICD-9-CM   1. Multiple pulmonary nodules (Two stable right sided nodules)  R91.8 793.19   2. Stage III (Severe) COPD  J44.9 496   3. RAMIRES (dyspnea on exertion)  R06.09 786.09   4. Chronic cough  R05.3 786.2       Discussion     We reviewed her chest imaging from October 2024  While this was not an LDCT it did not reveal any suspicious intrathoracic findings  Have recommended a screening CT scan of the chest in October 2025    I have encouraged her smoking cessation efforts    She will remain on Symbicort with albuterol as needed    I will plan to see her back in October 2025 with a LDCT    This visit represents an established relationship with whom the provider is providing ongoing longitudinal care related to serious and/or complex conditions    Moderate level of  Medical Decision Making complexity based on 2 or more chronic stable illnesses and an independent review of test results and/or prescription drug management.    Corona Henson MD  Note electronically signed    CC: Luís Coon MD

## 2025-03-11 ENCOUNTER — TRANSCRIBE ORDERS (OUTPATIENT)
Dept: ADMINISTRATIVE | Facility: HOSPITAL | Age: 65
End: 2025-03-11
Payer: COMMERCIAL

## 2025-03-11 DIAGNOSIS — R91.8 PULMONARY NODULES: Primary | ICD-10-CM

## 2025-04-17 ENCOUNTER — OFFICE VISIT (OUTPATIENT)
Dept: NEUROSURGERY | Facility: CLINIC | Age: 65
End: 2025-04-17
Payer: MEDICARE

## 2025-04-17 VITALS — HEIGHT: 63 IN | TEMPERATURE: 97.5 F | BODY MASS INDEX: 26.58 KG/M2 | WEIGHT: 150 LBS

## 2025-04-17 DIAGNOSIS — M54.12 CERVICAL RADICULOPATHY: Primary | ICD-10-CM

## 2025-04-17 RX ORDER — CHLORHEXIDINE GLUCONATE 40 MG/ML
SOLUTION TOPICAL
Qty: 120 ML | Refills: 0 | Status: SHIPPED | OUTPATIENT
Start: 2025-04-17

## 2025-04-17 RX ORDER — FAMOTIDINE 20 MG/1
20 TABLET, FILM COATED ORAL
OUTPATIENT
Start: 2025-04-17

## 2025-04-17 RX ORDER — DEXAMETHASONE SODIUM PHOSPHATE 10 MG/ML
10 INJECTION, SOLUTION INTRA-ARTICULAR; INTRALESIONAL; INTRAMUSCULAR; INTRAVENOUS; SOFT TISSUE
OUTPATIENT
Start: 2025-04-17

## 2025-04-17 NOTE — PROGRESS NOTES
"NEUROSURGERY PROGRESS NOTE    Patient: Mandi Baldwin  : 1960    Primary Care Provider: Luís Coon MD    Chief Complaint: Neck and left arm pain    Subjective: This is a 65-year-old female who was previously evaluated for neck and left arm pain due to disc herniation at C6-7.  She is here today for follow-up after undergoing physical therapy.  She states that therapy did not give any significant relief in her symptoms.  She continues to have pain that starts in her neck and radiates down the arm to the hand.  She does have EMG confirmation of a C6-7 radiculopathy.    Objective    Vital Signs: Temperature 97.5 °F (36.4 °C), temperature source Infrared, height 160 cm (63\"), weight 68 kg (150 lb).    Physical Exam  Awake, alert and oriented x 3  Opens eyes spont  Pupils 3 mm rx bilat  Extraocular muscles intact bilaterally  Face symmetric bilaterally  Tongue midline  5/5 in all 4 ext  No Mamadou's bilaterally    Current Medications:   Current Outpatient Medications:     albuterol (PROVENTIL HFA;VENTOLIN HFA) 108 (90 Base) MCG/ACT inhaler, Inhale 2 puffs Every 4 (Four) Hours As Needed for Wheezing., Disp: 1 inhaler, Rfl: 0    bisoprolol-hydrochlorothiazide (ZIAC) 5-6.25 MG per tablet, Take 1 tablet by mouth Daily., Disp: , Rfl:     budesonide-formoterol (SYMBICORT) 160-4.5 MCG/ACT inhaler, Inhale 2 puffs 2 (Two) Times a Day., Disp: 1 inhaler, Rfl: 0    buPROPion XL (WELLBUTRIN XL) 300 MG 24 hr tablet, Take 1 tablet by mouth Daily., Disp: , Rfl:     citalopram (CeleXA) 20 MG tablet, Take 1 tablet by mouth Daily., Disp: , Rfl:     HYDROcodone-acetaminophen (NORCO) 5-325 MG per tablet, Take 1 tablet by mouth., Disp: , Rfl:     nicotine (NICODERM CQ) 21 MG/24HR patch, , Disp: , Rfl:     ondansetron (ZOFRAN) 4 MG tablet, Take 1 tablet by mouth Every 8 (Eight) Hours As Needed. for nausea, Disp: , Rfl:     Timolol Maleate, Once-Daily, 0.5 % solution, INSTILL 1 DROP INTO BOTH EYES EVERY MORNING, Disp: , Rfl:     " Xiidra 5 % ophthalmic solution, Administer 1 drop to both eyes 1 (One) Time., Disp: , Rfl:      Laboratory Results:                              Brief Urine Lab Results       None          Microbiology Results (last 10 days)       ** No results found for the last 240 hours. **            Diagnostic Imaging: I reviewed and independently interpreted the new imaging.     Assessment/Plan:  This is a 65-year-old female who was previously evaluated for neck and left arm pain due to a disc herniation at C6-7.  The patient has failed conservative measures including physical therapy and medications.  She states the pain is severely affecting her quality of life and her ability to use her left arm.  As such, I think she would benefit from a C6-7 ACDF. I reviewed the risks, benefits and alternatives with the patient including but not limited to bleeding, infection, hoarseness, dysphagia, esophageal injury, CSF leak, nerve injury, spinal cord injury, rarely paralysis, pseudoarthrosis and hardware failure.  The patient understood these and has agreed to proceed with surgery.  We will look to get her scheduled in the near future.      Diagnoses and all orders for this visit:    1. Cervical radiculopathy (Primary)      Mandi L Baldwin  reports that she has been smoking cigarettes. She has a 23.5 pack-year smoking history. She has been exposed to tobacco smoke. She has never used smokeless tobacco.       Jsoué Noriega MD  04/17/25  12:05 EDT

## 2025-04-30 ENCOUNTER — PRE-ADMISSION TESTING (OUTPATIENT)
Dept: PREADMISSION TESTING | Facility: HOSPITAL | Age: 65
End: 2025-04-30
Payer: MEDICARE

## 2025-04-30 VITALS — HEIGHT: 63 IN | BODY MASS INDEX: 26.68 KG/M2 | WEIGHT: 150.57 LBS

## 2025-04-30 DIAGNOSIS — M54.12 CERVICAL RADICULOPATHY: ICD-10-CM

## 2025-04-30 LAB
ANION GAP SERPL CALCULATED.3IONS-SCNC: 13 MMOL/L (ref 5–15)
BUN SERPL-MCNC: 20 MG/DL (ref 8–23)
BUN/CREAT SERPL: 21.1 (ref 7–25)
CALCIUM SPEC-SCNC: 9.2 MG/DL (ref 8.6–10.5)
CHLORIDE SERPL-SCNC: 101 MMOL/L (ref 98–107)
CO2 SERPL-SCNC: 24 MMOL/L (ref 22–29)
CREAT SERPL-MCNC: 0.95 MG/DL (ref 0.57–1)
DEPRECATED RDW RBC AUTO: 44.5 FL (ref 37–54)
EGFRCR SERPLBLD CKD-EPI 2021: 66.2 ML/MIN/1.73
ERYTHROCYTE [DISTWIDTH] IN BLOOD BY AUTOMATED COUNT: 13.3 % (ref 12.3–15.4)
GLUCOSE SERPL-MCNC: 121 MG/DL (ref 65–99)
HCT VFR BLD AUTO: 42.8 % (ref 34–46.6)
HGB BLD-MCNC: 13.9 G/DL (ref 12–15.9)
MCH RBC QN AUTO: 29.5 PG (ref 26.6–33)
MCHC RBC AUTO-ENTMCNC: 32.5 G/DL (ref 31.5–35.7)
MCV RBC AUTO: 90.9 FL (ref 79–97)
PLATELET # BLD AUTO: 374 10*3/MM3 (ref 140–450)
PMV BLD AUTO: 10 FL (ref 6–12)
POTASSIUM SERPL-SCNC: 3.7 MMOL/L (ref 3.5–5.2)
QT INTERVAL: 412 MS
QTC INTERVAL: 421 MS
RBC # BLD AUTO: 4.71 10*6/MM3 (ref 3.77–5.28)
SODIUM SERPL-SCNC: 138 MMOL/L (ref 136–145)
WBC NRBC COR # BLD AUTO: 8.24 10*3/MM3 (ref 3.4–10.8)

## 2025-04-30 PROCEDURE — 36415 COLL VENOUS BLD VENIPUNCTURE: CPT

## 2025-04-30 PROCEDURE — 85027 COMPLETE CBC AUTOMATED: CPT

## 2025-04-30 PROCEDURE — 93005 ELECTROCARDIOGRAM TRACING: CPT

## 2025-04-30 PROCEDURE — 80048 BASIC METABOLIC PNL TOTAL CA: CPT

## 2025-04-30 RX ORDER — HYDRALAZINE HYDROCHLORIDE 25 MG/1
25 TABLET, FILM COATED ORAL 3 TIMES DAILY
COMMUNITY

## 2025-04-30 NOTE — PAT
An arrival time for procedure was not provided during PAT visit. If patient had any questions or concerns about their arrival time, they were instructed to contact their surgeon/physician.  Additionally, if the patient referred to an arrival time that was acquired from their my chart account, patient was encouraged to verify that time with their surgeon/physician. Arrival times are NOT provided in Pre Admission Testing Department.    Patient viewed general PAT education video as instructed in their preoperative information received from their surgeon.  Patient stated the general PAT education video was viewed in its entirety and survey completed.  Copies of PAT general education handouts (Incentive Spirometry, Meds to Beds Program, Patient Belongings, Pre-op skin preparation instructions, Blood Glucose testing, Visitor policy, Surgery FAQ, Code H) distributed to patient if not printed. Education related to the PAT pass and skin preparation for surgery (if applicable) completed in PAT as a reinforcement to PAT education video. Patient instructed to return PAT pass provided today as well as completed skin preparation sheet (if applicable) on the day of procedure.     Additionally if patient had not viewed video yet but intended to view it at home or in our waiting area, then referred them to the handout with QR code/link provided during PAT visit.  Encouraged patient/family to read PAT general education handouts thoroughly and notify PAT staff with any questions or concerns. Patient verbalized understanding of all information and priority content.    Patient instructed to drink 20 ounces of Gatorade or Gatorlyte (if diabetic) and it needs to be completed 1 hour (for Main OR patients) or 2 hours (scheduled  section & [facility] patients) before given arrival time for procedure (NO RED Gatorade and NO Gatorade Zero).    Patient verbalized understanding.    Patient denies any current skin issues.     Bactroban (if  prescribed) and Chlorhexidine Prescription prescribed by physician before PAT visit.  Verified with patient that medication(s) were picked up from their pharmacy.  Written instructions given to patient during PAT visit.  Patient/family also instructed to complete skin prep checklist and return the checklist on the day of surgery to preoperative staff.  Patient/family verbalized understanding.    Patient to apply Chlorhexadine wipes  to surgical area (as instructed) the night before procedure and the AM of procedure. Wipes provided.    EKG from PAT today faxed to anesthesiology department for review and cardiac clearance. RN spoke with dr. baez  and reviewed pertinent medical history and EKG results.  Per dr. baez, patient is cleared to proceed with procedure as planned without additional cardiac testing. Patient denies chest pain or increased shortness of breath.

## 2025-05-01 ENCOUNTER — DOCUMENTATION (OUTPATIENT)
Dept: NEUROSURGERY | Facility: CLINIC | Age: 65
End: 2025-05-01
Payer: COMMERCIAL

## 2025-05-07 ENCOUNTER — APPOINTMENT (OUTPATIENT)
Dept: GENERAL RADIOLOGY | Facility: HOSPITAL | Age: 65
End: 2025-05-07
Payer: MEDICARE

## 2025-05-07 ENCOUNTER — HOSPITAL ENCOUNTER (OUTPATIENT)
Facility: HOSPITAL | Age: 65
Discharge: HOME | End: 2025-05-08
Attending: STUDENT IN AN ORGANIZED HEALTH CARE EDUCATION/TRAINING PROGRAM | Admitting: STUDENT IN AN ORGANIZED HEALTH CARE EDUCATION/TRAINING PROGRAM
Payer: MEDICARE

## 2025-05-07 ENCOUNTER — ANESTHESIA (OUTPATIENT)
Dept: PERIOP | Facility: HOSPITAL | Age: 65
End: 2025-05-07
Payer: MEDICARE

## 2025-05-07 ENCOUNTER — ANESTHESIA EVENT (OUTPATIENT)
Dept: PERIOP | Facility: HOSPITAL | Age: 65
End: 2025-05-07
Payer: MEDICARE

## 2025-05-07 DIAGNOSIS — R91.8 MULTIPLE PULMONARY NODULES: ICD-10-CM

## 2025-05-07 DIAGNOSIS — J44.9 COPD MIXED TYPE: ICD-10-CM

## 2025-05-07 DIAGNOSIS — M54.12 CERVICAL RADICULOPATHY: Primary | ICD-10-CM

## 2025-05-07 PROCEDURE — 25010000002 LIDOCAINE PF 1% 1 % SOLUTION

## 2025-05-07 PROCEDURE — C1713 ANCHOR/SCREW BN/BN,TIS/BN: HCPCS | Performed by: STUDENT IN AN ORGANIZED HEALTH CARE EDUCATION/TRAINING PROGRAM

## 2025-05-07 PROCEDURE — 25010000002 PROPOFOL 10 MG/ML EMULSION

## 2025-05-07 PROCEDURE — 25010000002 FENTANYL CITRATE (PF) 50 MCG/ML SOLUTION

## 2025-05-07 PROCEDURE — 25010000002 FENTANYL CITRATE (PF) 100 MCG/2ML SOLUTION

## 2025-05-07 PROCEDURE — 25010000002 GLYCOPYRROLATE 1 MG/5ML SOLUTION

## 2025-05-07 PROCEDURE — 25010000002 CEFAZOLIN PER 500 MG: Performed by: STUDENT IN AN ORGANIZED HEALTH CARE EDUCATION/TRAINING PROGRAM

## 2025-05-07 PROCEDURE — 94640 AIRWAY INHALATION TREATMENT: CPT

## 2025-05-07 PROCEDURE — 25010000002 LIDOCAINE PF 1% 1 % SOLUTION: Performed by: ANESTHESIOLOGY

## 2025-05-07 PROCEDURE — 25810000003 LACTATED RINGERS PER 1000 ML: Performed by: ANESTHESIOLOGY

## 2025-05-07 PROCEDURE — 72040 X-RAY EXAM NECK SPINE 2-3 VW: CPT

## 2025-05-07 PROCEDURE — 25010000002 SUGAMMADEX 200 MG/2ML SOLUTION

## 2025-05-07 PROCEDURE — 25010000002 DEXAMETHASONE SODIUM PHOSPHATE 10 MG/ML SOLUTION

## 2025-05-07 PROCEDURE — 76000 FLUOROSCOPY <1 HR PHYS/QHP: CPT

## 2025-05-07 PROCEDURE — 25010000002 LIDOCAIN 0.5%-EPINEPHRINE 1:200000 0.5 %-1:200000 SOLUTION: Performed by: STUDENT IN AN ORGANIZED HEALTH CARE EDUCATION/TRAINING PROGRAM

## 2025-05-07 PROCEDURE — 25010000002 HYDROMORPHONE 1 MG/ML SOLUTION

## 2025-05-07 PROCEDURE — 25010000002 ONDANSETRON PER 1 MG

## 2025-05-07 PROCEDURE — 94799 UNLISTED PULMONARY SVC/PX: CPT

## 2025-05-07 DEVICE — FLOSEAL WITH RECOTHROM - 10ML.
Type: IMPLANTABLE DEVICE | Site: SPINE CERVICAL | Status: FUNCTIONAL
Brand: FLOSEAL HEMOSTATIC MATRIX

## 2025-05-07 DEVICE — SCREW 3120515 4.0 X 15 SELF DRILL VAR
Type: IMPLANTABLE DEVICE | Site: SPINE CERVICAL | Status: FUNCTIONAL
Brand: ATLANTIS® ANTERIOR CERVICAL PLATE SYSTEM

## 2025-05-07 DEVICE — SSC BONE WAX
Type: IMPLANTABLE DEVICE | Site: SPINE CERVICAL | Status: FUNCTIONAL
Brand: SSC BONE WAX

## 2025-05-07 DEVICE — CLIP LIGAT VASC HORIZON TI MD BLU 6CT: Type: IMPLANTABLE DEVICE | Site: SPINE CERVICAL | Status: FUNCTIONAL

## 2025-05-07 DEVICE — HORIZON TI SMALL 6 CLIPS/CART
Type: IMPLANTABLE DEVICE | Site: SPINE CERVICAL | Status: FUNCTIONAL
Brand: WECK

## 2025-05-07 DEVICE — PUTTY DBM GRAFTON 6CC: Type: IMPLANTABLE DEVICE | Site: SPINE CERVICAL | Status: FUNCTIONAL

## 2025-05-07 DEVICE — ANATOMIC PEEK W/ NANO 16 X 14 X 7 X 4.6°
Type: IMPLANTABLE DEVICE | Site: SPINE CERVICAL | Status: FUNCTIONAL
Brand: ANATOMIC PEEK™ CERVICAL FUSION SYSTEM WITH NANOTECHNOLOGY

## 2025-05-07 DEVICE — IMPLANTABLE DEVICE
Type: IMPLANTABLE DEVICE | Site: SPINE CERVICAL | Status: FUNCTIONAL
Brand: SURGIFOAM® ABSORBABLE GELATIN SPONGE, U.S.P.

## 2025-05-07 RX ORDER — DEXAMETHASONE SODIUM PHOSPHATE 10 MG/ML
10 INJECTION, SOLUTION INTRAMUSCULAR; INTRAVENOUS
Status: DISCONTINUED | OUTPATIENT
Start: 2025-05-07 | End: 2025-05-07 | Stop reason: HOSPADM

## 2025-05-07 RX ORDER — DIAZEPAM 5 MG/1
5 TABLET ORAL EVERY 6 HOURS PRN
Status: DISCONTINUED | OUTPATIENT
Start: 2025-05-07 | End: 2025-05-08 | Stop reason: HOSPADM

## 2025-05-07 RX ORDER — HYDROMORPHONE HYDROCHLORIDE 1 MG/ML
0.5 INJECTION, SOLUTION INTRAMUSCULAR; INTRAVENOUS; SUBCUTANEOUS
Status: COMPLETED | OUTPATIENT
Start: 2025-05-07 | End: 2025-05-07

## 2025-05-07 RX ORDER — ONDANSETRON 2 MG/ML
INJECTION INTRAMUSCULAR; INTRAVENOUS AS NEEDED
Status: DISCONTINUED | OUTPATIENT
Start: 2025-05-07 | End: 2025-05-07 | Stop reason: SURG

## 2025-05-07 RX ORDER — NALOXONE HCL 0.4 MG/ML
0.4 VIAL (ML) INJECTION
Status: DISCONTINUED | OUTPATIENT
Start: 2025-05-07 | End: 2025-05-08 | Stop reason: HOSPADM

## 2025-05-07 RX ORDER — AMOXICILLIN 250 MG
2 CAPSULE ORAL 2 TIMES DAILY
Status: DISCONTINUED | OUTPATIENT
Start: 2025-05-07 | End: 2025-05-08 | Stop reason: HOSPADM

## 2025-05-07 RX ORDER — BUPROPION HYDROCHLORIDE 150 MG/1
300 TABLET ORAL DAILY
Status: DISCONTINUED | OUTPATIENT
Start: 2025-05-08 | End: 2025-05-08 | Stop reason: HOSPADM

## 2025-05-07 RX ORDER — BISACODYL 5 MG/1
5 TABLET, DELAYED RELEASE ORAL DAILY PRN
Status: DISCONTINUED | OUTPATIENT
Start: 2025-05-07 | End: 2025-05-08 | Stop reason: HOSPADM

## 2025-05-07 RX ORDER — LABETALOL HYDROCHLORIDE 5 MG/ML
5 INJECTION, SOLUTION INTRAVENOUS
Status: DISCONTINUED | OUTPATIENT
Start: 2025-05-07 | End: 2025-05-07 | Stop reason: HOSPADM

## 2025-05-07 RX ORDER — DROPERIDOL 2.5 MG/ML
0.62 INJECTION, SOLUTION INTRAMUSCULAR; INTRAVENOUS
Status: DISCONTINUED | OUTPATIENT
Start: 2025-05-07 | End: 2025-05-07 | Stop reason: HOSPADM

## 2025-05-07 RX ORDER — IPRATROPIUM BROMIDE AND ALBUTEROL SULFATE 2.5; .5 MG/3ML; MG/3ML
3 SOLUTION RESPIRATORY (INHALATION) ONCE AS NEEDED
Status: DISCONTINUED | OUTPATIENT
Start: 2025-05-07 | End: 2025-05-07 | Stop reason: HOSPADM

## 2025-05-07 RX ORDER — HYDROMORPHONE HYDROCHLORIDE 1 MG/ML
0.25 INJECTION, SOLUTION INTRAMUSCULAR; INTRAVENOUS; SUBCUTANEOUS EVERY 4 HOURS PRN
Status: DISCONTINUED | OUTPATIENT
Start: 2025-05-07 | End: 2025-05-08 | Stop reason: HOSPADM

## 2025-05-07 RX ORDER — FENTANYL CITRATE 50 UG/ML
INJECTION, SOLUTION INTRAMUSCULAR; INTRAVENOUS AS NEEDED
Status: DISCONTINUED | OUTPATIENT
Start: 2025-05-07 | End: 2025-05-07 | Stop reason: SURG

## 2025-05-07 RX ORDER — ALBUTEROL SULFATE 0.83 MG/ML
2.5 SOLUTION RESPIRATORY (INHALATION) EVERY 4 HOURS PRN
Status: DISCONTINUED | OUTPATIENT
Start: 2025-05-07 | End: 2025-05-08 | Stop reason: HOSPADM

## 2025-05-07 RX ORDER — ONDANSETRON 4 MG/1
4 TABLET, ORALLY DISINTEGRATING ORAL EVERY 6 HOURS PRN
Status: DISCONTINUED | OUTPATIENT
Start: 2025-05-07 | End: 2025-05-08 | Stop reason: HOSPADM

## 2025-05-07 RX ORDER — PROMETHAZINE HYDROCHLORIDE 25 MG/1
25 SUPPOSITORY RECTAL ONCE AS NEEDED
Status: DISCONTINUED | OUTPATIENT
Start: 2025-05-07 | End: 2025-05-07 | Stop reason: HOSPADM

## 2025-05-07 RX ORDER — MAGNESIUM HYDROXIDE 1200 MG/15ML
LIQUID ORAL AS NEEDED
Status: DISCONTINUED | OUTPATIENT
Start: 2025-05-07 | End: 2025-05-07 | Stop reason: HOSPADM

## 2025-05-07 RX ORDER — PROMETHAZINE HYDROCHLORIDE 25 MG/1
25 TABLET ORAL ONCE AS NEEDED
Status: DISCONTINUED | OUTPATIENT
Start: 2025-05-07 | End: 2025-05-07 | Stop reason: HOSPADM

## 2025-05-07 RX ORDER — ENOXAPARIN SODIUM 100 MG/ML
40 INJECTION SUBCUTANEOUS DAILY
Status: DISCONTINUED | OUTPATIENT
Start: 2025-05-08 | End: 2025-05-08 | Stop reason: HOSPADM

## 2025-05-07 RX ORDER — ACETAMINOPHEN 650 MG/1
650 SUPPOSITORY RECTAL EVERY 4 HOURS PRN
Status: DISCONTINUED | OUTPATIENT
Start: 2025-05-07 | End: 2025-05-08 | Stop reason: HOSPADM

## 2025-05-07 RX ORDER — SODIUM CHLORIDE 0.9 % (FLUSH) 0.9 %
10 SYRINGE (ML) INJECTION EVERY 12 HOURS SCHEDULED
Status: DISCONTINUED | OUTPATIENT
Start: 2025-05-07 | End: 2025-05-07 | Stop reason: HOSPADM

## 2025-05-07 RX ORDER — ROCURONIUM BROMIDE 10 MG/ML
INJECTION, SOLUTION INTRAVENOUS AS NEEDED
Status: DISCONTINUED | OUTPATIENT
Start: 2025-05-07 | End: 2025-05-07 | Stop reason: SURG

## 2025-05-07 RX ORDER — ONDANSETRON 2 MG/ML
4 INJECTION INTRAMUSCULAR; INTRAVENOUS ONCE AS NEEDED
Status: DISCONTINUED | OUTPATIENT
Start: 2025-05-07 | End: 2025-05-07 | Stop reason: HOSPADM

## 2025-05-07 RX ORDER — ALBUTEROL SULFATE 90 UG/1
2 INHALANT RESPIRATORY (INHALATION) EVERY 4 HOURS PRN
Status: DISCONTINUED | OUTPATIENT
Start: 2025-05-07 | End: 2025-05-07

## 2025-05-07 RX ORDER — FAMOTIDINE 10 MG/ML
20 INJECTION, SOLUTION INTRAVENOUS ONCE
Status: CANCELLED | OUTPATIENT
Start: 2025-05-07 | End: 2025-05-07

## 2025-05-07 RX ORDER — OXYCODONE AND ACETAMINOPHEN 5; 325 MG/1; MG/1
2 TABLET ORAL EVERY 4 HOURS PRN
Status: DISCONTINUED | OUTPATIENT
Start: 2025-05-07 | End: 2025-05-08 | Stop reason: HOSPADM

## 2025-05-07 RX ORDER — SODIUM CHLORIDE, SODIUM LACTATE, POTASSIUM CHLORIDE, CALCIUM CHLORIDE 600; 310; 30; 20 MG/100ML; MG/100ML; MG/100ML; MG/100ML
9 INJECTION, SOLUTION INTRAVENOUS CONTINUOUS
Status: ACTIVE | OUTPATIENT
Start: 2025-05-08 | End: 2025-05-08

## 2025-05-07 RX ORDER — HYDRALAZINE HYDROCHLORIDE 25 MG/1
25 TABLET, FILM COATED ORAL 3 TIMES DAILY
Status: DISCONTINUED | OUTPATIENT
Start: 2025-05-07 | End: 2025-05-08 | Stop reason: HOSPADM

## 2025-05-07 RX ORDER — BISOPROLOL FUMARATE 5 MG/1
5 TABLET, FILM COATED ORAL DAILY
Status: DISCONTINUED | OUTPATIENT
Start: 2025-05-07 | End: 2025-05-08 | Stop reason: HOSPADM

## 2025-05-07 RX ORDER — SODIUM CHLORIDE 0.9 % (FLUSH) 0.9 %
10 SYRINGE (ML) INJECTION AS NEEDED
Status: DISCONTINUED | OUTPATIENT
Start: 2025-05-07 | End: 2025-05-08 | Stop reason: HOSPADM

## 2025-05-07 RX ORDER — ACETAMINOPHEN 160 MG/5ML
650 SOLUTION ORAL EVERY 4 HOURS PRN
Status: DISCONTINUED | OUTPATIENT
Start: 2025-05-07 | End: 2025-05-08 | Stop reason: HOSPADM

## 2025-05-07 RX ORDER — GLYCOPYRROLATE 0.2 MG/ML
INJECTION INTRAMUSCULAR; INTRAVENOUS AS NEEDED
Status: DISCONTINUED | OUTPATIENT
Start: 2025-05-07 | End: 2025-05-07 | Stop reason: SURG

## 2025-05-07 RX ORDER — FENTANYL CITRATE 50 UG/ML
INJECTION, SOLUTION INTRAMUSCULAR; INTRAVENOUS
Status: COMPLETED
Start: 2025-05-07 | End: 2025-05-07

## 2025-05-07 RX ORDER — FENTANYL CITRATE 50 UG/ML
INJECTION, SOLUTION INTRAMUSCULAR; INTRAVENOUS
Status: DISPENSED
Start: 2025-05-07 | End: 2025-05-08

## 2025-05-07 RX ORDER — SODIUM CHLORIDE 0.9 % (FLUSH) 0.9 %
3-10 SYRINGE (ML) INJECTION AS NEEDED
Status: DISCONTINUED | OUTPATIENT
Start: 2025-05-07 | End: 2025-05-07 | Stop reason: HOSPADM

## 2025-05-07 RX ORDER — OXYCODONE AND ACETAMINOPHEN 5; 325 MG/1; MG/1
TABLET ORAL
Status: COMPLETED
Start: 2025-05-07 | End: 2025-05-07

## 2025-05-07 RX ORDER — HYDROCODONE BITARTRATE AND ACETAMINOPHEN 7.5; 325 MG/1; MG/1
1 TABLET ORAL EVERY 4 HOURS PRN
Status: DISCONTINUED | OUTPATIENT
Start: 2025-05-07 | End: 2025-05-07 | Stop reason: HOSPADM

## 2025-05-07 RX ORDER — DEXAMETHASONE SODIUM PHOSPHATE 10 MG/ML
INJECTION, SOLUTION INTRAMUSCULAR; INTRAVENOUS AS NEEDED
Status: DISCONTINUED | OUTPATIENT
Start: 2025-05-07 | End: 2025-05-07 | Stop reason: SURG

## 2025-05-07 RX ORDER — FENTANYL CITRATE 50 UG/ML
50 INJECTION, SOLUTION INTRAMUSCULAR; INTRAVENOUS
Status: COMPLETED | OUTPATIENT
Start: 2025-05-07 | End: 2025-05-07

## 2025-05-07 RX ORDER — BISACODYL 10 MG
10 SUPPOSITORY, RECTAL RECTAL DAILY PRN
Status: DISCONTINUED | OUTPATIENT
Start: 2025-05-07 | End: 2025-05-08 | Stop reason: HOSPADM

## 2025-05-07 RX ORDER — SODIUM CHLORIDE 0.9 % (FLUSH) 0.9 %
10 SYRINGE (ML) INJECTION AS NEEDED
Status: DISCONTINUED | OUTPATIENT
Start: 2025-05-07 | End: 2025-05-07 | Stop reason: HOSPADM

## 2025-05-07 RX ORDER — FAMOTIDINE 20 MG/1
20 TABLET, FILM COATED ORAL
Status: COMPLETED | OUTPATIENT
Start: 2025-05-07 | End: 2025-05-07

## 2025-05-07 RX ORDER — POLYETHYLENE GLYCOL 3350 17 G/17G
17 POWDER, FOR SOLUTION ORAL DAILY PRN
Status: DISCONTINUED | OUTPATIENT
Start: 2025-05-07 | End: 2025-05-08 | Stop reason: HOSPADM

## 2025-05-07 RX ORDER — SODIUM CHLORIDE 0.9 % (FLUSH) 0.9 %
3 SYRINGE (ML) INJECTION EVERY 12 HOURS SCHEDULED
Status: DISCONTINUED | OUTPATIENT
Start: 2025-05-07 | End: 2025-05-07 | Stop reason: HOSPADM

## 2025-05-07 RX ORDER — SODIUM CHLORIDE 9 MG/ML
9 INJECTION, SOLUTION INTRAVENOUS AS NEEDED
Status: DISCONTINUED | OUTPATIENT
Start: 2025-05-07 | End: 2025-05-07 | Stop reason: HOSPADM

## 2025-05-07 RX ORDER — SODIUM CHLORIDE 9 MG/ML
40 INJECTION, SOLUTION INTRAVENOUS AS NEEDED
Status: DISCONTINUED | OUTPATIENT
Start: 2025-05-07 | End: 2025-05-08 | Stop reason: HOSPADM

## 2025-05-07 RX ORDER — HYDROCODONE BITARTRATE AND ACETAMINOPHEN 7.5; 325 MG/1; MG/1
TABLET ORAL
Status: COMPLETED
Start: 2025-05-07 | End: 2025-05-07

## 2025-05-07 RX ORDER — LIDOCAINE HYDROCHLORIDE 10 MG/ML
0.5 INJECTION, SOLUTION EPIDURAL; INFILTRATION; INTRACAUDAL; PERINEURAL ONCE AS NEEDED
Status: COMPLETED | OUTPATIENT
Start: 2025-05-07 | End: 2025-05-07

## 2025-05-07 RX ORDER — SODIUM CHLORIDE 0.9 % (FLUSH) 0.9 %
3 SYRINGE (ML) INJECTION EVERY 12 HOURS SCHEDULED
Status: DISCONTINUED | OUTPATIENT
Start: 2025-05-07 | End: 2025-05-08 | Stop reason: HOSPADM

## 2025-05-07 RX ORDER — BUDESONIDE AND FORMOTEROL FUMARATE DIHYDRATE 160; 4.5 UG/1; UG/1
2 AEROSOL RESPIRATORY (INHALATION)
Status: DISCONTINUED | OUTPATIENT
Start: 2025-05-07 | End: 2025-05-08 | Stop reason: HOSPADM

## 2025-05-07 RX ORDER — FAMOTIDINE 20 MG/1
20 TABLET, FILM COATED ORAL ONCE
Status: CANCELLED | OUTPATIENT
Start: 2025-05-07 | End: 2025-05-07

## 2025-05-07 RX ORDER — DROPERIDOL 2.5 MG/ML
0.62 INJECTION, SOLUTION INTRAMUSCULAR; INTRAVENOUS ONCE AS NEEDED
Status: DISCONTINUED | OUTPATIENT
Start: 2025-05-07 | End: 2025-05-07 | Stop reason: HOSPADM

## 2025-05-07 RX ORDER — HYDRALAZINE HYDROCHLORIDE 20 MG/ML
5 INJECTION INTRAMUSCULAR; INTRAVENOUS
Status: DISCONTINUED | OUTPATIENT
Start: 2025-05-07 | End: 2025-05-07 | Stop reason: HOSPADM

## 2025-05-07 RX ORDER — ONDANSETRON 2 MG/ML
4 INJECTION INTRAMUSCULAR; INTRAVENOUS EVERY 6 HOURS PRN
Status: DISCONTINUED | OUTPATIENT
Start: 2025-05-07 | End: 2025-05-08 | Stop reason: HOSPADM

## 2025-05-07 RX ORDER — PROPOFOL 10 MG/ML
VIAL (ML) INTRAVENOUS AS NEEDED
Status: DISCONTINUED | OUTPATIENT
Start: 2025-05-07 | End: 2025-05-07 | Stop reason: SURG

## 2025-05-07 RX ORDER — NALOXONE HCL 0.4 MG/ML
0.4 VIAL (ML) INJECTION AS NEEDED
Status: DISCONTINUED | OUTPATIENT
Start: 2025-05-07 | End: 2025-05-07 | Stop reason: HOSPADM

## 2025-05-07 RX ORDER — ASPIRIN 81 MG/1
81 TABLET ORAL DAILY
COMMUNITY
End: 2025-05-08 | Stop reason: HOSPADM

## 2025-05-07 RX ORDER — SODIUM CHLORIDE, SODIUM LACTATE, POTASSIUM CHLORIDE, CALCIUM CHLORIDE 600; 310; 30; 20 MG/100ML; MG/100ML; MG/100ML; MG/100ML
9 INJECTION, SOLUTION INTRAVENOUS CONTINUOUS
Status: ACTIVE | OUTPATIENT
Start: 2025-05-07 | End: 2025-05-08

## 2025-05-07 RX ORDER — HYDROCHLOROTHIAZIDE 12.5 MG/1
6.25 TABLET ORAL DAILY
Status: DISCONTINUED | OUTPATIENT
Start: 2025-05-08 | End: 2025-05-08 | Stop reason: HOSPADM

## 2025-05-07 RX ORDER — LIDOCAINE HYDROCHLORIDE AND EPINEPHRINE 5; 5 MG/ML; UG/ML
INJECTION, SOLUTION INFILTRATION; PERINEURAL AS NEEDED
Status: DISCONTINUED | OUTPATIENT
Start: 2025-05-07 | End: 2025-05-07 | Stop reason: HOSPADM

## 2025-05-07 RX ORDER — ACETAMINOPHEN 325 MG/1
650 TABLET ORAL EVERY 4 HOURS PRN
Status: DISCONTINUED | OUTPATIENT
Start: 2025-05-07 | End: 2025-05-08 | Stop reason: HOSPADM

## 2025-05-07 RX ORDER — LIDOCAINE HYDROCHLORIDE 10 MG/ML
INJECTION, SOLUTION EPIDURAL; INFILTRATION; INTRACAUDAL; PERINEURAL AS NEEDED
Status: DISCONTINUED | OUTPATIENT
Start: 2025-05-07 | End: 2025-05-07 | Stop reason: SURG

## 2025-05-07 RX ORDER — MIDAZOLAM HYDROCHLORIDE 1 MG/ML
0.5 INJECTION, SOLUTION INTRAMUSCULAR; INTRAVENOUS
Status: DISCONTINUED | OUTPATIENT
Start: 2025-05-07 | End: 2025-05-07 | Stop reason: HOSPADM

## 2025-05-07 RX ADMIN — SENNOSIDES AND DOCUSATE SODIUM 2 TABLET: 50; 8.6 TABLET ORAL at 20:12

## 2025-05-07 RX ADMIN — HYDROMORPHONE HYDROCHLORIDE 0.5 MG: 1 INJECTION, SOLUTION INTRAMUSCULAR; INTRAVENOUS; SUBCUTANEOUS at 16:15

## 2025-05-07 RX ADMIN — LIDOCAINE HYDROCHLORIDE 0.5 ML: 10 INJECTION, SOLUTION EPIDURAL; INFILTRATION; INTRACAUDAL; PERINEURAL at 12:42

## 2025-05-07 RX ADMIN — ROCURONIUM BROMIDE 50 MG: 10 INJECTION INTRAVENOUS at 13:31

## 2025-05-07 RX ADMIN — HYDROMORPHONE HYDROCHLORIDE 0.5 MG: 1 INJECTION, SOLUTION INTRAMUSCULAR; INTRAVENOUS; SUBCUTANEOUS at 15:55

## 2025-05-07 RX ADMIN — HYDRALAZINE HYDROCHLORIDE 25 MG: 25 TABLET ORAL at 20:12

## 2025-05-07 RX ADMIN — FENTANYL CITRATE 50 MCG: 50 INJECTION, SOLUTION INTRAMUSCULAR; INTRAVENOUS at 16:37

## 2025-05-07 RX ADMIN — SODIUM CHLORIDE 2000 MG: 900 INJECTION INTRAVENOUS at 20:11

## 2025-05-07 RX ADMIN — FENTANYL CITRATE 100 MCG: 50 INJECTION, SOLUTION INTRAMUSCULAR; INTRAVENOUS at 13:30

## 2025-05-07 RX ADMIN — HYDROCODONE BITARTRATE AND ACETAMINOPHEN 1 TABLET: 7.5; 325 TABLET ORAL at 15:45

## 2025-05-07 RX ADMIN — FENTANYL CITRATE 50 MCG: 50 INJECTION, SOLUTION INTRAMUSCULAR; INTRAVENOUS at 16:22

## 2025-05-07 RX ADMIN — FENTANYL CITRATE 50 MCG: 50 INJECTION, SOLUTION INTRAMUSCULAR; INTRAVENOUS at 16:30

## 2025-05-07 RX ADMIN — LIDOCAINE HYDROCHLORIDE 50 MG: 10 INJECTION, SOLUTION EPIDURAL; INFILTRATION; INTRACAUDAL; PERINEURAL at 13:30

## 2025-05-07 RX ADMIN — FAMOTIDINE 20 MG: 20 TABLET, FILM COATED ORAL at 12:45

## 2025-05-07 RX ADMIN — FENTANYL CITRATE 50 MCG: 50 INJECTION, SOLUTION INTRAMUSCULAR; INTRAVENOUS at 16:44

## 2025-05-07 RX ADMIN — OXYCODONE HYDROCHLORIDE AND ACETAMINOPHEN 2 TABLET: 5; 325 TABLET ORAL at 16:50

## 2025-05-07 RX ADMIN — HYDROMORPHONE HYDROCHLORIDE 0.5 MG: 1 INJECTION, SOLUTION INTRAMUSCULAR; INTRAVENOUS; SUBCUTANEOUS at 16:05

## 2025-05-07 RX ADMIN — HYDROMORPHONE HYDROCHLORIDE 0.5 MG: 1 INJECTION, SOLUTION INTRAMUSCULAR; INTRAVENOUS; SUBCUTANEOUS at 15:45

## 2025-05-07 RX ADMIN — SODIUM CHLORIDE, POTASSIUM CHLORIDE, SODIUM LACTATE AND CALCIUM CHLORIDE 9 ML/HR: 600; 310; 30; 20 INJECTION, SOLUTION INTRAVENOUS at 12:42

## 2025-05-07 RX ADMIN — PROPOFOL 150 MG: 10 INJECTION, EMULSION INTRAVENOUS at 13:30

## 2025-05-07 RX ADMIN — SODIUM CHLORIDE 2000 MG: 900 INJECTION INTRAVENOUS at 13:37

## 2025-05-07 RX ADMIN — OXYCODONE HYDROCHLORIDE AND ACETAMINOPHEN 2 TABLET: 5; 325 TABLET ORAL at 21:50

## 2025-05-07 RX ADMIN — SUGAMMADEX 200 MG: 100 INJECTION, SOLUTION INTRAVENOUS at 15:06

## 2025-05-07 RX ADMIN — GLYCOPYRROLATE 0.2 MCG: 0.2 INJECTION, SOLUTION INTRAMUSCULAR; INTRAVENOUS at 14:49

## 2025-05-07 RX ADMIN — DEXAMETHASONE SODIUM PHOSPHATE 10 MG: 10 INJECTION, SOLUTION INTRAMUSCULAR; INTRAVENOUS at 13:35

## 2025-05-07 RX ADMIN — BUDESONIDE AND FORMOTEROL FUMARATE DIHYDRATE 2 PUFF: 160; 4.5 AEROSOL RESPIRATORY (INHALATION) at 20:51

## 2025-05-07 RX ADMIN — ONDANSETRON 4 MG: 2 INJECTION INTRAMUSCULAR; INTRAVENOUS at 15:00

## 2025-05-07 NOTE — BRIEF OP NOTE
CERVICAL DISCECTOMY ANTERIOR WITH FUSION  Progress Note    Mandi Baldwin  5/7/2025    Pre-op Diagnosis:   Cervical radiculopathy [M54.12]       Post-Op Diagnosis Codes:     * Cervical radiculopathy [M54.12]    Procedure(s):      Procedure(s):  CERVICAL DISCECTOMY ANTERIOR WITH FUSION c6-7              Surgeon(s):  Josué Noriega MD    Anesthesia: General    Staff:   Circulator: Rosa Elena Oconnor RN; Kaylen Webber RN  Physician Assistant: Yudith Leon PA-C  Radiology Technologist: Aureliano Hearn RT; Kenney, Michael  Scrkhari Person: Vickie Murphy Sydney  Vendor Representative: Jed Bolivar; Joseph Silver  Nursing Assistant: Macarena Melchor       Estimated Blood Loss: minimal    Urine Voided: * No values recorded between 5/7/2025  1:27 PM and 5/7/2025  2:58 PM *    Specimens:                None      Drains: * No LDAs found *    Findings: Successful decompression and fusion at C6-7      Complications: None apparent          Josué Noriega MD     Date: 5/7/2025  Time: 14:58 EDT

## 2025-05-07 NOTE — H&P
Pre-Op H&P  Mandi Baldwin  [id number]  1960      Chief complaint: Neck and left arm pain      Subjective:  Patient is a 65 y.o.female presents for scheduled surgery by Dr. Noriega. She anticipates a CERVICAL DISCECTOMY ANTERIOR WITH FUSION c6-7 today.  The patient has had intermittent neck pain and left arm pain for the past few months.  She endorses having numbness in her left hand as well, which is present all of the time. Up to this point, conservative treatment methods have failed to alleviate her symptoms.    Review of Systems:  Constitutional-- No fever, chills or sweats. No fatigue.  CV-- No chest pain, palpitation or syncope. +HTN.  Resp-- No SOB, cough, hemoptysis  Skin--No rashes or lesions      Allergies:   Allergies   Allergen Reactions    Sulfa Antibiotics Anaphylaxis    Penicillins Rash         Home Meds:  Medications Prior to Admission   Medication Sig Dispense Refill Last Dose/Taking    albuterol (PROVENTIL HFA;VENTOLIN HFA) 108 (90 Base) MCG/ACT inhaler Inhale 2 puffs Every 4 (Four) Hours As Needed for Wheezing. 1 inhaler 0 Past Month    bisoprolol-hydrochlorothiazide (ZIAC) 5-6.25 MG per tablet Take 1 tablet by mouth Daily.   5/6/2025 at  9:00 AM    budesonide-formoterol (SYMBICORT) 160-4.5 MCG/ACT inhaler Inhale 2 puffs 2 (Two) Times a Day. 1 inhaler 0 5/6/2025    buPROPion XL (WELLBUTRIN XL) 300 MG 24 hr tablet Take 1 tablet by mouth Daily.   5/7/2025 at  8:00 AM    Chlorhexidine Gluconate 4 % solution Shower each day with solution for 5 days beginning 5 days before surgery. 120 mL 0 5/6/2025    citalopram (CeleXA) 20 MG tablet Take 1 tablet by mouth Daily.   5/7/2025 at  8:00 AM    hydrALAZINE (APRESOLINE) 25 MG tablet Take 1 tablet by mouth 3 (Three) Times a Day.   5/7/2025 at  8:00 AM    HYDROcodone-acetaminophen (NORCO) 5-325 MG per tablet Take 1 tablet by mouth.   5/7/2025 at  8:00 AM    nicotine (NICODERM CQ) 21 MG/24HR patch    Past Week    ondansetron (ZOFRAN) 4 MG tablet Take  "1 tablet by mouth Every 8 (Eight) Hours As Needed. for nausea   5/6/2025    Timolol Maleate, Once-Daily, 0.5 % solution INSTILL 1 DROP INTO BOTH EYES EVERY MORNING   5/7/2025 at  8:00 AM         PMH:   Past Medical History:   Diagnosis Date    Bulging of cervical intervertebral disc     Cervical disc disorder     COPD (chronic obstructive pulmonary disease)     Emphysema of lung     Glaucoma     Hypertension     Urinary tract infection      PSH:    Past Surgical History:   Procedure Laterality Date    AUGMENTATION MAMMAPLASTY  2007    redone in 2022 due to encapsulation    CHOLECYSTECTOMY      COLONOSCOPY N/A 6/19/2023    Procedure: COLONOSCOPY;  Surgeon: Cuong Rush MD;  Location: [facility];  Service: Gastroenterology;  Laterality: N/A;    EYE SURGERY      TONSILLECTOMY         Immunization History:  Influenza: Yes  Pneumococcal: Yes  Tetanus: No  Covid : x2    Social History:   Tobacco:   Social History     Tobacco Use   Smoking Status Every Day    Current packs/day: 0.50    Average packs/day: 0.5 packs/day for 47.0 years (23.5 ttl pk-yrs)    Types: Cigarettes    Passive exposure: Current   Smokeless Tobacco Never      Alcohol:     Social History     Substance and Sexual Activity   Alcohol Use Not Currently         Physical Exam:/92 (BP Location: Right arm, Patient Position: Sitting)   Pulse 66   Temp 98.2 °F (36.8 °C) (Temporal)   Resp 18   Ht 160 cm (63\")   Wt 68 kg (150 lb)   SpO2 95%   BMI 26.57 kg/m²       General Appearance:    Alert, cooperative, no distress, appears stated age   Head:    Normocephalic, without obvious abnormality, atraumatic   Lungs:     Clear to auscultation bilaterally, respirations unlabored    Heart:   Regular rate and rhythm, S1 and S2 normal    Abdomen:    Soft without tenderness   Extremities:   Extremities normal, atraumatic, no cyanosis or edema   Skin:   Skin color, texture, turgor normal, no rashes or lesions   Neurologic:   Grossly intact "     Results Review:     LABS:  Lab Results   Component Value Date    WBC 8.24 04/30/2025    HGB 13.9 04/30/2025    HCT 42.8 04/30/2025    MCV 90.9 04/30/2025     04/30/2025    NEUTROABS 5.8 12/10/2024    GLUCOSE 121 (H) 04/30/2025    BUN 20 04/30/2025    CREATININE 0.95 04/30/2025    EGFRIFNONA 80 02/16/2022     04/30/2025    K 3.7 04/30/2025     04/30/2025    CO2 24.0 04/30/2025    MG 2.1 10/16/2024    CALCIUM 9.2 04/30/2025    ALBUMIN 4.5 09/12/2024    AST 15 09/12/2024    ALT 13 09/12/2024    BILITOT 0.2 09/12/2024       RADIOLOGY:  Imaging Results (Last 72 Hours)       ** No results found for the last 72 hours. **            I reviewed the patient's new clinical results.    Cancer Staging (if applicable)  Cancer Patient: __ yes _x_no __unknown; If yes, clinical stage T:__ N:__M:__, stage group or __N/A      Impression: Cervical radiculopathy      Plan: CERVICAL DISCECTOMY ANTERIOR WITH FUSION c6-7       Arben Singh, APRN   5/7/2025   12:39 EDT

## 2025-05-07 NOTE — ANESTHESIA POSTPROCEDURE EVALUATION
Patient: Mandi Baldwin    Procedure Summary       Date: 05/07/25 Room / Location:  [facility]    Anesthesia Start: 1327 Anesthesia Stop: 1524    Procedure: CERVICAL DISCECTOMY ANTERIOR WITH FUSION c6-7 (Spine Cervical) Diagnosis:       Cervical radiculopathy      (Cervical radiculopathy [M54.12])    Surgeons: Josué Noriega MD Provider: Gwyn Baptiste MD    Anesthesia Type: general ASA Status: 3            Anesthesia Type: general    Vitals  Vitals Value Taken Time   /75 05/07/25 15:21   Temp 97.7    Pulse 80 05/07/25 15:23   Resp 14    SpO2 100 % 05/07/25 15:23   Vitals shown include unfiled device data.        Post Anesthesia Care and Evaluation    Patient location during evaluation: PACU  Patient participation: complete - patient participated  Level of consciousness: awake and alert  Pain score: 0  Pain management: adequate    Airway patency: patent  Anesthetic complications: No anesthetic complications  PONV Status: none  Cardiovascular status: hemodynamically stable and acceptable  Respiratory status: nonlabored ventilation, acceptable and nasal cannula  Hydration status: acceptable

## 2025-05-07 NOTE — ANESTHESIA PROCEDURE NOTES
Airway  Reason: elective    Date/Time: 5/7/2025 1:32 PM  Airway not difficult    General Information and Staff    Patient location during procedure: OR  CRNA/CAA: Indra Ruiz CRNA    Indications and Patient Condition  Indications for airway management: airway protection    Preoxygenated: yes  MILS not maintained throughout    Mask difficulty assessment: 1 - vent by mask    Final Airway Details    Final airway type: endotracheal airway      Successful airway: ETT  Cuffed: yes   Successful intubation technique: video laryngoscopy  Adjuncts used in placement: intubating stylet  Endotracheal tube insertion site: oral  Blade: Aceves  Blade size: 3  ETT size (mm): 7.0  Cormack-Lehane Classification: grade I - full view of glottis  Placement verified by: chest auscultation and capnometry   Measured from: lips  ETT/EBT  to lips (cm): 21  Number of attempts at approach: 1  Assessment: lips, teeth, and gum same as pre-op and atraumatic intubation    Additional Comments  Negative epigastric sounds, Breath sound equal bilaterally with symmetric chest rise and fall

## 2025-05-07 NOTE — ANESTHESIA PREPROCEDURE EVALUATION
Anesthesia Evaluation     Patient summary reviewed and Nursing notes reviewed   no history of anesthetic complications:   NPO Solid Status: > 8 hours  NPO Liquid Status: > 2 hours           Airway   Mallampati: II  TM distance: >3 FB  Neck ROM: full  Anterior and Possible difficult intubation  Dental    (+) edentulous, upper dentures and lower dentures    Comment: Snap on dentures with posts    Pulmonary - normal exam    breath sounds clear to auscultation  (+) a smoker Current, cigarettes, COPD (daily symbicort and rescue inhaler) moderate,shortness of breath  Cardiovascular - normal exam    ECG reviewed  Rhythm: regular  Rate: normal    (+) hypertension, RAMIRES    ROS comment: EKG 4/30/25:  HR 63  Normal sinus rhythm  Left axis deviation  Septal infarct (cited on or before 11-Jun-2017)  Abnormal ECG    ECHO 05/2022:  · Normal left ventricular cavity size and wall thickness noted. All left ventricular wall segments contract normally.  · Left ventricular ejection fraction appears to be 61 - 65%.  · Left ventricular diastolic function is consistent with (grade I) impaired relaxation.  · The aortic valve is not well visualized. No aortic valve regurgitation or stenosis is present. The aortic valve is abnormal in structure. The aortic valve exhibits sclerosis.  · The mitral valve is structurally normal with no regurgitation or significant stenosis present.  · There is no evidence of pericardial effusion.      Neuro/Psych  (+) numbness (cervical radiculopathy)  GI/Hepatic/Renal/Endo - negative ROS     Musculoskeletal     Abdominal    Substance History - negative use     OB/GYN negative ob/gyn ROS         Other   arthritis,                       Anesthesia Plan    ASA 3     general     intravenous induction       Plan discussed with CRNA.      CODE STATUS:          Scribe Attestation (For Scribes USE Only)... Scribe Attestation (For Scribes USE Only).../Attending Attestation (For Attendings USE Only)...

## 2025-05-07 NOTE — OP NOTE
Date of operation: May 7, 2025    Attending surgeon: Dr. Josué Noriega MD  Surgical Assistance: CRAIG Lopez    Preoperative diagnosis: Cervical radiculopathy    Postoperative diagnosis: The same    Operations performed:  Anterior Cervical Decompression and Fusion at C6-7 levels  Anterior Plate placement across 2-3 segments  Use of structural allograft with PEEK  Use of bone matrix with [place]    Findings: Successful decompression and fusion at C6-7    Spinal Surgery Levels Completed:1 Level    Specimens: none    Indications: Medically refractory arm pain    Procedure in detail: The patient was brought back to the operating room, placed under general anesthesia and endotracheally intubated.  The patient was then positioned supine with his neck mildly extended.  At this time, we used x-ray to localize our incision that correlated to the C6-7 disc level.  This incision was off to the patient's right and measured approximately 2.5 inches.  This area was then prepped and draped in a sterile fashion.  A timeout was performed.  Antibiotics were given and local anesthetic was injected into the incision marking.  At this time, we incised the incision with a knife and using blunt and sharp dissection carried out our dissection through the anterior neck until we came to the cervical spine.  We then placed a spinal needle in what we believed to be the C6-7 disc.  We then used x-ray to confirm that we were at the correct level.  We then used Bovie cautery to remove the longus off of the vertebral body of the C6 body, the disc space of C6-7 and the body of C7.  At this time we then placed our retractors and then placed 2 Candor pins in the level above and below our disc space.  At this time, we deflated the ET tube, and then reinflated.  At this time we incised the disc space with a knife and using curettes, pituitaries and Kerrisons we began removing the disc.  We removed any osteophyte covering the disc and extended  our discectomy out to the uncovertebral joints bilaterally.  At this time, we brought the microscope into the field.  Under direct microscopic vision and with use of microdissection techniques, we continued to work deeper until we encountered the PLL.  At this time, we used drills to remove any posterior osteophyte that was covering the PLL.  At this time, we used a nerve hook to get below the PLL and again used Kerrisons to remove the remainder of it until we had exposed dura.  We continued our decompression laterally and we could visualize the nerve roots bilaterally.  We then removed any remaining disc or osteophyte inferiorly and superiorly in the disc space.  At this time, we were satisfied with our decompression.  We achieved complete hemostasis and then turned our attention to placement of our graft. We placed a 7 mm x 16 mm x 14 mm anatomic PEEK cage filled with [place] DBF bone matrix into the C6-7 disc space.  The graft was secured in this position.  We then removed our [place] pins and turned our attention to placement of our plate. We placed a 21 mm Atlantis plate and secured it down with 4, 15 mm screws. The plate was final tightened.  At this time, we brought x-ray back into the field and confirmed good placement of the hardware. It was at this time that we turned our attention to hemostasis. Using combination of bipolar cautery and Gelfoam powder, we achieved complete hemostasis in the field.  At this time, we turned our attention to the closure where the dermis was closed with interrupted inverted 3-0 Vicryl stitches and the skin with glue.     Yudith Leon was present for all portions of the surgery and assisted with patient positioning, opening, retraction, suturing, and closing.  At the end of the case, all counts were correct x2 and there were no complications noted.

## 2025-05-07 NOTE — PLAN OF CARE
Problem: Adult Inpatient Plan of Care  Goal: Plan of Care Review  5/7/2025 1855 by Jenise Cano RN  Outcome: Not Progressing  5/7/2025 1855 by Jenise Cano RN  Outcome: Not Progressing  Flowsheets (Taken 5/7/2025 1855)  Progress: no change  Plan of Care Reviewed With: patient  Goal: Patient-Specific Goal (Individualized)  Outcome: Not Progressing     Problem: Adult Inpatient Plan of Care  Goal: Absence of Hospital-Acquired Illness or Injury  Outcome: Progressing  Intervention: Identify and Manage Fall Risk  Description: Perform standard risk assessment on admission using a validated tool or comprehensive approach appropriate to the patient; reassess fall risk frequently, with change in status or transfer to another level of care.Communicate risk to interprofessional healthcare team; ensure fall risk visible cue.Determine need for increased observation, equipment and environmental modification, as well as use of supportive, nonskid footwear.Adjust safety measures to individual needs and identified risk factors.Reinforce the importance of active participation with fall risk prevention, safety, and physical activity with the patient and family.Perform regular intentional rounding to assess need for position change, pain assessment and personal needs, including assistance with toileting.  Recent Flowsheet Documentation  Taken 5/7/2025 1735 by Jenise Cano RN  Safety Promotion/Fall Prevention:   nonskid shoes/slippers when out of bed   mobility aid in reach   lighting adjusted  Intervention: Prevent Skin Injury  Description: Perform a screening for skin injury risk, such as pressure or moisture-associated skin damage on admission and at regular intervals throughout hospital stay.Keep all areas of skin (especially folds) clean and dry.Maintain adequate skin hydration.Relieve and redistribute pressure and protect bony prominences and skin at risk for injury; implement measures based on patient-specific risk  factors.Match turning and repositioning schedule to clinical condition.Encourage weight shift frequently; assist with reposition if unable to complete independently.Float heels off bed; avoid pressure on the Achilles tendon.Keep skin free from extended contact with medical devices.Optimize nutrition and hydration.Encourage functional activity and mobility, as early as tolerated.Use aids (e.g., slide boards, mechanical lift) during transfer.  Recent Flowsheet Documentation  Taken 5/7/2025 1735 by Jenise Cano RN  Body Position: supine  Skin Protection:   transparent dressing maintained   incontinence pads utilized  Intervention: Prevent and Manage VTE (Venous Thromboembolism) Risk  Description: Assess for VTE (venous thromboembolism) risk.Promote early mobilization; encourage both active and passive leg exercises, if unable to ambulate.Initiate and maintain compression or other therapy, as indicated, based on identified risk in accordance with organizational protocol and provider order.Recognize the patient's individual risk for bleeding before initiating pharmacologic thromboprophylaxis.  Recent Flowsheet Documentation  Taken 5/7/2025 1735 by Jenise Cano RN  VTE Prevention/Management:   SCDs (sequential compression devices) on   bilateral  Intervention: Prevent Infection  Description: Maintain skin and mucous membrane integrity; promote hand, oral and pulmonary hygiene.Optimize fluid balance, nutrition, sleep and glycemic control to maximize infection resistance.Identify potential sources of infection early to prevent or mitigate progression of infection (e.g., wound, lines, devices).Evaluate ongoing need for invasive devices; remove promptly when no longer indicated.Review vaccination status.  Recent Flowsheet Documentation  Taken 5/7/2025 1735 by Jenise Cano RN  Infection Prevention:   single patient room provided   rest/sleep promoted   hand hygiene promoted   environmental surveillance performed   cohorting  utilized  Goal: Optimal Comfort and Wellbeing  Outcome: Progressing  Intervention: Monitor Pain and Promote Comfort  Description: Assess pain level, treatment efficacy and patient response at regular intervals using a consistent pain scale.Consider the presence and impact of preexisting chronic pain.Encourage patient and caregiver involvement in pain assessment, interventions and safety measures.Promote activity; balance with sleep and rest to enhance healing.  Recent Flowsheet Documentation  Taken 5/7/2025 1735 by Jenise Cano RN  Pain Management Interventions:   position adjusted   pillow support provided  Intervention: Provide Person-Centered Care  Description: Use a family-focused approach to care; encourage support system presence and participation.Develop trust and rapport by proactively providing information, encouraging questions, addressing concerns and offering reassurance.Acknowledge emotional response to hospitalization.Recognize and utilize personal coping strategies and strengths; develop goals via shared decision-making.Honor spiritual and cultural preferences.  Recent Flowsheet Documentation  Taken 5/7/2025 1735 by Jenise Cano RN  Trust Relationship/Rapport:   care explained   choices provided   emotional support provided  Goal: Readiness for Transition of Care  Outcome: Progressing     Problem: Skin Injury Risk Increased  Goal: Skin Health and Integrity  Outcome: Progressing  Intervention: Optimize Skin Protection  Description: Perform a full pressure injury risk assessment, as indicated by screening, upon admission to care unit.Reassess skin (full inspection and injury risk, including skin temperature, consistency and color) frequently (e.g., scheduled interval, with change in condition) to provide optimal early detection and prevention.Maintain adequate tissue perfusion (e.g., encourage fluid balance; avoid crossing legs, constrictive clothing or devices) to promote tissue oxygenation.Maintain  head of bed at lowest degree of elevation tolerated, considering medical condition and other restrictions. Use positioning supports to prevent sliding and friction. Consider low friction textiles.Avoid positioning onto an area that remains reddened or on bony prominences.Minimize incontinence and moisture (e.g., toileting schedule; moisture-wicking pad, diaper or incontinence collection device; skin moisture barrier).Cleanse skin promptly and gently, when soiled, utilizing a pH-balanced cleanser.Relieve and redistribute pressure (e.g., scheduled position changes, weight shifts, use of support surface, medical device repositioning, protective dressing application, use of positioning device, microclimate control, use of pressure-injury-monitorEncourage increased activity, such as sitting in a chair at the bedside or early mobilization, when able to tolerate. Avoid prolonged sitting.  Recent Flowsheet Documentation  Taken 5/7/2025 1735 by Jenise Cano RN  Activity Management: activity encouraged  Pressure Reduction Techniques:   frequent weight shift encouraged   weight shift assistance provided  Head of Bed (HOB) Positioning: HOB elevated  Pressure Reduction Devices:   pressure-redistributing mattress utilized   positioning supports utilized  Skin Protection:   transparent dressing maintained   incontinence pads utilized  Intervention: Promote and Optimize Oral Intake  Description: Perform a nutrition assessment that includes a nutrition-focused physical exam; identify malnutrition risk.Assess for adequate oral intake; if inadequate, offer oral supplemental food or drinks to enhance calorie and protein intake.Assess for vitamin and mineral deficiencies; supplement if depleted.Assess need for, and assist with, meal set-up and feeding.Adjust diet or meal schedule based on preferences and tolerance.Minimize unnecessary dietary restrictions to increase oral intake.Establish bowel elimination program to increase comfort  and appetite.Provide and encourage oral hygiene to enhance desire to eat.Consider enteral nutrition support if oral intake remains inadequate; provide parenteral nutrition if enteral is contraindicated.  Recent Flowsheet Documentation  Taken 5/7/2025 1735 by Jenise Cano RN  Nutrition Interventions: food preferences provided   Goal Outcome Evaluation:  Plan of Care Reviewed With: patient        Progress: no change

## 2025-05-08 VITALS
HEIGHT: 63 IN | WEIGHT: 150 LBS | TEMPERATURE: 97.6 F | HEART RATE: 66 BPM | SYSTOLIC BLOOD PRESSURE: 120 MMHG | RESPIRATION RATE: 18 BRPM | OXYGEN SATURATION: 92 % | BODY MASS INDEX: 26.58 KG/M2 | DIASTOLIC BLOOD PRESSURE: 65 MMHG

## 2025-05-08 LAB
ANION GAP SERPL CALCULATED.3IONS-SCNC: 10 MMOL/L (ref 5–15)
BASOPHILS # BLD AUTO: 0.03 10*3/MM3 (ref 0–0.2)
BASOPHILS NFR BLD AUTO: 0.2 % (ref 0–1.5)
BUN SERPL-MCNC: 12 MG/DL (ref 8–23)
BUN/CREAT SERPL: 14 (ref 7–25)
CALCIUM SPEC-SCNC: 8.7 MG/DL (ref 8.6–10.5)
CHLORIDE SERPL-SCNC: 100 MMOL/L (ref 98–107)
CO2 SERPL-SCNC: 24 MMOL/L (ref 22–29)
CREAT SERPL-MCNC: 0.86 MG/DL (ref 0.57–1)
DEPRECATED RDW RBC AUTO: 45 FL (ref 37–54)
EGFRCR SERPLBLD CKD-EPI 2021: 74.6 ML/MIN/1.73
EOSINOPHIL # BLD AUTO: 0 10*3/MM3 (ref 0–0.4)
EOSINOPHIL NFR BLD AUTO: 0 % (ref 0.3–6.2)
ERYTHROCYTE [DISTWIDTH] IN BLOOD BY AUTOMATED COUNT: 13.4 % (ref 12.3–15.4)
GLUCOSE SERPL-MCNC: 133 MG/DL (ref 65–99)
HCT VFR BLD AUTO: 39.2 % (ref 34–46.6)
HGB BLD-MCNC: 13 G/DL (ref 12–15.9)
IMM GRANULOCYTES # BLD AUTO: 0.05 10*3/MM3 (ref 0–0.05)
IMM GRANULOCYTES NFR BLD AUTO: 0.4 % (ref 0–0.5)
LYMPHOCYTES # BLD AUTO: 1.56 10*3/MM3 (ref 0.7–3.1)
LYMPHOCYTES NFR BLD AUTO: 12.1 % (ref 19.6–45.3)
MCH RBC QN AUTO: 30 PG (ref 26.6–33)
MCHC RBC AUTO-ENTMCNC: 33.2 G/DL (ref 31.5–35.7)
MCV RBC AUTO: 90.3 FL (ref 79–97)
MONOCYTES # BLD AUTO: 0.67 10*3/MM3 (ref 0.1–0.9)
MONOCYTES NFR BLD AUTO: 5.2 % (ref 5–12)
NEUTROPHILS NFR BLD AUTO: 10.54 10*3/MM3 (ref 1.7–7)
NEUTROPHILS NFR BLD AUTO: 82.1 % (ref 42.7–76)
NRBC BLD AUTO-RTO: 0 /100 WBC (ref 0–0.2)
PLATELET # BLD AUTO: 334 10*3/MM3 (ref 140–450)
PMV BLD AUTO: 10 FL (ref 6–12)
POTASSIUM SERPL-SCNC: 4.2 MMOL/L (ref 3.5–5.2)
RBC # BLD AUTO: 4.34 10*6/MM3 (ref 3.77–5.28)
SODIUM SERPL-SCNC: 134 MMOL/L (ref 136–145)
WBC NRBC COR # BLD AUTO: 12.85 10*3/MM3 (ref 3.4–10.8)

## 2025-05-08 PROCEDURE — 80048 BASIC METABOLIC PNL TOTAL CA: CPT | Performed by: STUDENT IN AN ORGANIZED HEALTH CARE EDUCATION/TRAINING PROGRAM

## 2025-05-08 PROCEDURE — 25010000002 CEFAZOLIN PER 500 MG: Performed by: STUDENT IN AN ORGANIZED HEALTH CARE EDUCATION/TRAINING PROGRAM

## 2025-05-08 PROCEDURE — 94761 N-INVAS EAR/PLS OXIMETRY MLT: CPT

## 2025-05-08 PROCEDURE — 85025 COMPLETE CBC W/AUTO DIFF WBC: CPT | Performed by: STUDENT IN AN ORGANIZED HEALTH CARE EDUCATION/TRAINING PROGRAM

## 2025-05-08 PROCEDURE — 94664 DEMO&/EVAL PT USE INHALER: CPT

## 2025-05-08 PROCEDURE — 97110 THERAPEUTIC EXERCISES: CPT

## 2025-05-08 PROCEDURE — 94799 UNLISTED PULMONARY SVC/PX: CPT

## 2025-05-08 PROCEDURE — 97165 OT EVAL LOW COMPLEX 30 MIN: CPT

## 2025-05-08 PROCEDURE — 97161 PT EVAL LOW COMPLEX 20 MIN: CPT

## 2025-05-08 RX ORDER — AMOXICILLIN 250 MG
1 CAPSULE ORAL DAILY
Qty: 20 TABLET | Refills: 0 | Status: SHIPPED | OUTPATIENT
Start: 2025-05-08

## 2025-05-08 RX ORDER — OXYCODONE AND ACETAMINOPHEN 5; 325 MG/1; MG/1
1 TABLET ORAL EVERY 8 HOURS PRN
Qty: 20 TABLET | Refills: 0 | Status: SHIPPED | OUTPATIENT
Start: 2025-05-08

## 2025-05-08 RX ORDER — DIAZEPAM 5 MG/1
5 TABLET ORAL EVERY 8 HOURS PRN
Qty: 20 TABLET | Refills: 0 | Status: SHIPPED | OUTPATIENT
Start: 2025-05-08

## 2025-05-08 RX ADMIN — HYDRALAZINE HYDROCHLORIDE 25 MG: 25 TABLET ORAL at 08:52

## 2025-05-08 RX ADMIN — BISOPROLOL FUMARATE 5 MG: 5 TABLET, FILM COATED ORAL at 08:52

## 2025-05-08 RX ADMIN — OXYCODONE HYDROCHLORIDE AND ACETAMINOPHEN 2 TABLET: 5; 325 TABLET ORAL at 04:36

## 2025-05-08 RX ADMIN — SODIUM CHLORIDE 2000 MG: 900 INJECTION INTRAVENOUS at 04:28

## 2025-05-08 RX ADMIN — Medication 3 ML: at 08:54

## 2025-05-08 RX ADMIN — BUDESONIDE AND FORMOTEROL FUMARATE DIHYDRATE 2 PUFF: 160; 4.5 AEROSOL RESPIRATORY (INHALATION) at 08:06

## 2025-05-08 RX ADMIN — BUPROPION HYDROCHLORIDE 300 MG: 150 TABLET, EXTENDED RELEASE ORAL at 08:52

## 2025-05-08 RX ADMIN — DIAZEPAM 5 MG: 5 TABLET ORAL at 01:05

## 2025-05-08 RX ADMIN — OXYCODONE HYDROCHLORIDE AND ACETAMINOPHEN 2 TABLET: 5; 325 TABLET ORAL at 10:59

## 2025-05-08 RX ADMIN — HYDROCHLOROTHIAZIDE 6.25 MG: 12.5 TABLET ORAL at 08:51

## 2025-05-08 RX ADMIN — SENNOSIDES AND DOCUSATE SODIUM 2 TABLET: 50; 8.6 TABLET ORAL at 08:51

## 2025-05-08 NOTE — THERAPY EVALUATION
Patient Name: Mandi Baldwin  : 1960    MRN: [id number]                              Today's Date: 2025       Admit Date: 2025    Visit Dx:     ICD-10-CM ICD-9-CM   1. Cervical radiculopathy  M54.12 723.4     Patient Active Problem List   Diagnosis    Encounter for screening for malignant neoplasm of colon    Multiple pulmonary nodules (Two stable right sided nodules)    Lingular infiltrate    Stage III (Severe) COPD    Tobacco abuse    RAMIRES (dyspnea on exertion)    Chronic cough    Cervical radiculopathy     Past Medical History:   Diagnosis Date    Bulging of cervical intervertebral disc     Cervical disc disorder     COPD (chronic obstructive pulmonary disease)     Emphysema of lung     Glaucoma     Hypertension     Urinary tract infection      Past Surgical History:   Procedure Laterality Date    ANTERIOR CERVICAL DISCECTOMY W/ FUSION N/A 2025    Procedure: CERVICAL DISCECTOMY ANTERIOR WITH FUSION C6-7;  Surgeon: Josué Noriega MD;  Location: [facility] beds [facility] OR;  Service: Neurosurgery;  Laterality: N/A;    AUGMENTATION MAMMAPLASTY      redone in  due to encapsulation    CHOLECYSTECTOMY      COLONOSCOPY N/A 2023    Procedure: COLONOSCOPY;  Surgeon: Cuong Rush MD;  Location: [facility] OR;  Service: Gastroenterology;  Laterality: N/A;    EYE SURGERY      TONSILLECTOMY        General Information       Row Name 25 1019          Physical Therapy Time and Intention    Document Type evaluation  -AC     Mode of Treatment physical therapy  -AC       Row Name 25 1019          General Information    Patient Profile Reviewed yes  -AC     Prior Level of Function independent:;all household mobility;community mobility;gait;transfer;bed mobility;ADL's  -AC     Existing Precautions/Restrictions fall;oxygen therapy device and L/min;spinal  -AC     Barriers to Rehab none identified  -AC       Row Name 25 1019          Living Environment    Current Living Arrangements  home;other (see comments)  planning to live w/ mother upon d/c  -AC     People in Home parent(s)  -       Row Name 05/08/25 1019          Home Main Entrance    Number of Stairs, Main Entrance three  -AC     Stair Railings, Main Entrance none  -AC       Row Name 05/08/25 1019          Stairs Within Home, Primary    Number of Stairs, Within Home, Primary none  -AC       Row Name 05/08/25 1019          Cognition    Orientation Status (Cognition) oriented x 3  -AC       Row Name 05/08/25 1019          Safety Issues/Impairments Affecting Functional Mobility    Safety Issues Affecting Function (Mobility) insight into deficits/self-awareness;safety precaution awareness;safety precautions follow-through/compliance  -AC     Impairments Affecting Function (Mobility) endurance/activity tolerance;pain;shortness of breath;strength  -AC               User Key  (r) = Recorded By, (t) = Taken By, (c) = Cosigned By      Initials Name Provider Type    AC Bethany Ortega, PT Physical Therapist                   Mobility       Row Name 05/08/25 1020          Bed Mobility    Comment, (Bed Mobility) Pt sitting EOB upon arrival  -       Row Name 05/08/25 1020          Sit-Stand Transfer    Sit-Stand Humphreys (Transfers) contact guard;2 person assist;verbal cues  -AC     Assistive Device (Sit-Stand Transfers) other (see comments)  no AD  -AC     Comment, (Sit-Stand Transfer) STS 2x from EOB  -       Row Name 05/08/25 1020          Gait/Stairs (Locomotion)    Humphreys Level (Gait) contact guard;1 person assist;verbal cues;nonverbal cues (demo/gesture)  -AC     Assistive Device (Gait) other (see comments)  no AD  -AC     Patient was able to Ambulate yes  -AC     Distance in Feet (Gait) 350  -AC     Deviations/Abnormal Patterns (Gait) lui decreased;gait speed decreased  -AC     Bilateral Gait Deviations forward flexed posture;heel strike decreased  -AC     Comment, (Gait/Stairs) Pt ambulated throughout hallway w/ CGAx1, no AD,  and a close chair follow. Pt required 2L NC for ambulation to stay >90%. In addition pt required VC for cervical spinal precautions while ambulating throughout hallway. No LOB or buckling noted. Gait distance limited by fatigue  -               User Key  (r) = Recorded By, (t) = Taken By, (c) = Cosigned By      Initials Name Provider Type     Bethany Ortega, PT Physical Therapist                   Obj/Interventions       Row Name 05/08/25 1022          Range of Motion Comprehensive    General Range of Motion bilateral lower extremity ROM WNL  -       Row Name 05/08/25 1022          Strength Comprehensive (MMT)    General Manual Muscle Testing (MMT) Assessment lower extremity strength deficits identified  -     Comment, General Manual Muscle Testing (MMT) Assessment BLE's grossly WFL  -       Row Name 05/08/25 1022          Motor Skills    Motor Skills functional endurance  -     Functional Endurance impaired, requiring supplemental O2 for mobility  -     Therapeutic Exercise shoulder;other (see comments)  chin tucks 10x, shoulder rolls 10x, shoulder shrug 10x, scapular squeeze 10x, chicken wing 10x  -       Row Name 05/08/25 1022          Balance    Balance Assessment sitting static balance;sitting dynamic balance;standing static balance;standing dynamic balance  -     Static Sitting Balance independent  -     Dynamic Sitting Balance supervision  -     Position, Sitting Balance unsupported;sitting edge of bed  -     Static Standing Balance standby assist  -     Dynamic Standing Balance contact guard  -     Position/Device Used, Standing Balance supported  -     Balance Interventions sitting;standing;sit to stand;supported;static;dynamic  -[facility] Name 05/08/25 1022          Sensory Assessment (Somatosensory)    Sensory Assessment (Somatosensory) LE sensation intact  -               User Key  (r) = Recorded By, (t) = Taken By, (c) = Cosigned By      Initials Name Provider Type    AC  Bethany Ortega, PT Physical Therapist                   Goals/Plan       Row Name 05/08/25 1027          Bed Mobility Goal 1 (PT)    Activity/Assistive Device (Bed Mobility Goal 1, PT) supine to sit  -AC     Hardin Level/Cues Needed (Bed Mobility Goal 1, PT) modified independence  -AC     Time Frame (Bed Mobility Goal 1, PT) short term goal (STG);5 days  -       Row Name 05/08/25 1027          Transfer Goal 1 (PT)    Activity/Assistive Device (Transfer Goal 1, PT) sit-to-stand/stand-to-sit  -AC     Hardin Level/Cues Needed (Transfer Goal 1, PT) modified independence  -AC     Time Frame (Transfer Goal 1, PT) short term goal (STG);5 days  -       Row Name 05/08/25 1027          Gait Training Goal 1 (PT)    Activity/Assistive Device (Gait Training Goal 1, PT) gait (walking locomotion);decrease fall risk;improve balance and speed;increase endurance/gait distance  -AC     Hardin Level (Gait Training Goal 1, PT) standby assist;modified independence  -AC     Distance (Gait Training Goal 1, PT) 400  -AC     Time Frame (Gait Training Goal 1, PT) long term goal (LTG);10 days  -       Row Name 05/08/25 1027          Stairs Goal 1 (PT)    Activity/Assistive Device (Stairs Goal 1, PT) stairs, all skills  -AC     [place]in Level/Cues Needed (Stairs Goal 1, PT) modified independence  -AC     Number of Stairs (Stairs Goal 1, PT) 3  -AC     Time Frame (Stairs Goal 1, PT) long term goal (LTG);10 days  -       Row Name 05/08/25 1027          Therapy Assessment/Plan (PT)    Planned Therapy Interventions (PT) balance training;bed mobility training;gait training;home exercise program;patient/family education;stair training;strengthening;transfer training;postural re-education  -AC               User Key  (r) = Recorded By, (t) = Taken By, (c) = Cosigned By      Initials Name Provider Type    AC Bethany Ortega, PT Physical Therapist                   Clinical Impression       Row Name 05/08/25 1023          Pain     Pretreatment Pain Rating 2/10  -     Posttreatment Pain Rating 2/10  -     Pain Location neck  -AC     Pain Side/Orientation generalized  -AC     Pain Management Interventions activity modification encouraged;exercise or physical activity utilized;positioning techniques utilized  -     Response to Pain Interventions activity participation with tolerable pain  -       Row Name 05/08/25 1023          Plan of Care Review    Plan of Care Reviewed With patient  -AC     Progress no change  -AC     Outcome Evaluation PT initial evaluation complete. Pt presents w/ generalized weakness, impaired functional endurance, and pain this date. Pt was educated on cervical spinal precautions, HEP reviewed, and handout was provided. In addition pt ambulated 350ft w/ CGAX1, no AD, 2L NC, and a close chair follow. No LOB or buckling noted however pt required VC for spinal precautions throughout. In addition O2 sats read 89% upon re-entering the room w/ 2L NC on. IPPT services indicated to address deficits listed above. D/c rec is home w/ assist when medically ready for discharge.  -       Row Name 05/08/25 1023          Therapy Assessment/Plan (PT)    Rehab Potential (PT) good  -     Criteria for Skilled Interventions Met (PT) yes  -     Therapy Frequency (PT) daily  -     Predicted Duration of Therapy Intervention (PT) 2 days  -       Row Name 05/08/25 1023          Vital Signs    O2 Delivery Pre Treatment nasal cannula  -AC     O2 Delivery Intra Treatment nasal cannula  -AC     O2 Delivery Post Treatment nasal cannula  -AC     Pre Patient Position Sitting  -AC     Intra Patient Position Standing  -AC     Post Patient Position Sitting  -       Row Name 05/08/25 1023          Positioning and Restraints    Pre-Treatment Position in bed  -AC     Post Treatment Position chair  -AC     In Chair notified nsg;reclined;sitting;call light within reach;encouraged to call for assist;exit alarm on;waffle cushion;legs elevated   -AC               User Key  (r) = Recorded By, (t) = Taken By, (c) = Cosigned By      Initials Name Provider Type    AC Bethany Ortega, PT Physical Therapist                   Outcome Measures       Row Name 05/08/25 1027 05/08/25 0800       How much help from another person do you currently need...    Turning from your back to your side while in flat bed without using bedrails? 4  -AC 4  -SHOBHA    Moving from lying on back to sitting on the side of a flat bed without bedrails? 4  -AC 4  -SHOBHA    Moving to and from a bed to a chair (including a wheelchair)? 3  -AC 3  -SHOBHA    Standing up from a chair using your arms (e.g., wheelchair, bedside chair)? 3  -AC 3  -SHOBHA    Climbing 3-5 steps with a railing? 3  -AC 3  -SHOBHA    To walk in hospital room? 3  -AC 3  -SHOBHA    AM-PAC 6 Clicks Score (PT) 20  -AC 20  -SHOBHA    Highest Level of Mobility Goal 6 --> Walk 10 steps or more  -AC 6 --> Walk 10 steps or more  -SHOBHA      Row Name 05/08/25 1027 05/08/25 1006       Functional Assessment    Outcome Measure Options AM-PAC 6 Clicks Basic Mobility (PT)  -AC AM-PAC 6 Clicks Daily Activity (OT)  -AJ              User Key  (r) = Recorded By, (t) = Taken By, (c) = Cosigned By      Initials Name Provider Type    SHOBHA Christine De Los Santos, RN Registered Nurse    Bethany Barahona, PT Physical Therapist    AJ Adia Brito, OT Occupational Therapist                                 Physical Therapy Education       Title: PT OT SLP Therapies (Done)       Topic: Physical Therapy (Done)       Point: Mobility training (Done)       Learning Progress Summary            Patient Acceptance, E, VU by  at 5/8/2025 1028                      Point: Home exercise program (Done)       Learning Progress Summary            Patient Acceptance, E, VU by  at 5/8/2025 1028                      Point: Body mechanics (Done)       Learning Progress Summary            Patient Acceptance, E, VU by  at 5/8/2025 1028                      Point: Precautions (Done)       Learning  Progress Summary            Patient Acceptance, E, VU by  at 5/8/2025 1028                                      User Key       Initials Effective Dates Name Provider Type Discipline     07/11/24 -  Bethany Ortega, PT Physical Therapist PT                  PT Recommendation and Plan  Planned Therapy Interventions (PT): balance training, bed mobility training, gait training, home exercise program, patient/family education, stair training, strengthening, transfer training, postural re-education  Progress: no change  Outcome Evaluation: PT initial evaluation complete. Pt presents w/ generalized weakness, impaired functional endurance, and pain this date. Pt was educated on cervical spinal precautions, HEP reviewed, and handout was provided. In addition pt ambulated 350ft w/ CGAX1, no AD, 2L NC, and a close chair follow. No LOB or buckling noted however pt required VC for spinal precautions throughout. In addition O2 sats read 89% upon re-entering the room w/ 2L NC on. IPPT services indicated to address deficits listed above. D/c rec is home w/ assist when medically ready for discharge.     Time Calculation:   PT Evaluation Complexity  History, PT Evaluation Complexity: 1-2 personal factors and/or comorbidities  Examination of Body Systems (PT Eval Complexity): total of 3 or more elements  Clinical Presentation (PT Evaluation Complexity): stable  Clinical Decision Making (PT Evaluation Complexity): low complexity  Overall Complexity (PT Evaluation Complexity): low complexity     PT Charges       Row Name 05/08/25 1028             Time Calculation    Start Time 0915  -      PT Received On 05/08/25  -      PT Goal Re-Cert Due Date 05/18/25  -         Time Calculation- PT    Total Timed Code Minutes- PT 10 minute(s)  -AC         Timed Charges    79980 - PT Therapeutic Exercise Minutes 10  -AC         Untimed Charges    PT Eval/Re-eval Minutes 47  -AC         Total Minutes    Timed Charges Total Minutes 10  -AC       Untimed Charges Total Minutes 47  -AC       Total Minutes 57  -AC                User Key  (r) = Recorded By, (t) = Taken By, (c) = Cosigned By      Initials Name Provider Type    AC Bethany Ortega, PT Physical Therapist                  Therapy Charges for Today       Code Description Service Date Service Provider Modifiers Qty    [id number]  PT THER PROC EA 15 MIN 5/8/2025 Bethany Ortega, PT GP 1    [id number] HC PT EVAL LOW COMPLEXITY 4 5/8/2025 Bethany Ortega, PT GP 1            PT G-Codes  Outcome Measure Options: AM-PAC 6 Clicks Basic Mobility (PT)  AM-PAC 6 Clicks Score (PT): 20  PT Discharge Summary  Anticipated Discharge Disposition (PT): home with assist    Bethany Ortega PT  5/8/2025

## 2025-05-08 NOTE — PROGRESS NOTES
"  [facility]    NEUROSURGERY PROGRESS NOTE    05/08/25    Chief Complaint: Cervical radiculopathy    Subjective: Stable overnight.  Patient endorses significant improvement in her upper extremity pain.  Denies any significant difficulty swallowing    1 Day Post-Op    Objective:     /64   Pulse 73   Temp 97.8 °F (36.6 °C) (Oral)   Resp 18   Ht 160 cm (63\")   Wt 68 kg (150 lb)   SpO2 93%   BMI 26.57 kg/m²        Physical Exam  Patient appears comfortable, resting  Awake, alert and oriented x 3  Speech f/c  Opens eyes spontaneously  EOM intact bilaterally  Pupils 3mm reactive bilaterally  Face symmetric  5/5 in bilateral upper extremities  Incision clean dry and intact      Intake/Output Summary (Last 24 hours) at 5/8/2025 0914  Last data filed at 5/8/2025 0800  Gross per 24 hour   Intake 1040 ml   Output --   Net 1040 ml         Current Facility-Administered Medications:     acetaminophen (TYLENOL) tablet 650 mg, 650 mg, Oral, Q4H PRN **OR** acetaminophen (TYLENOL) 160 MG/5ML oral solution 650 mg, 650 mg, Oral, Q4H PRN **OR** acetaminophen (TYLENOL) suppository 650 mg, 650 mg, Rectal, Q4H PRN, Josué Noriega MD    albuterol (PROVENTIL) nebulizer solution 0.083% 2.5 mg/3mL, 2.5 mg, Nebulization, Q4H PRN, Josué Noriega MD    sennosides-docusate (PERICOLACE) 8.6-50 MG per tablet 2 tablet, 2 tablet, Oral, BID, 2 tablet at 05/08/25 0851 **AND** polyethylene glycol (MIRALAX) packet 17 g, 17 g, Oral, Daily PRN **AND** bisacodyl (DULCOLAX) EC tablet 5 mg, 5 mg, Oral, Daily PRN **AND** bisacodyl (DULCOLAX) suppository 10 mg, 10 mg, Rectal, Daily PRN, Josué Noriega MD    bisoprolol (ZEBeta) tablet 5 mg, 5 mg, Oral, Daily, 5 mg at 05/08/25 0852 **AND** hydroCHLOROthiazide tablet 6.25 mg, 6.25 mg, Oral, Daily, Josué Noriega MD, 6.25 mg at 05/08/25 0851    budesonide-formoterol (SYMBICORT) 160-4.5 MCG/ACT inhaler 2 puff, 2 puff, Inhalation, BID - " RT, Josué Noriega MD, 2 puff at 05/08/25 0806    buPROPion XL (WELLBUTRIN XL) 24 hr tablet 300 mg, 300 mg, Oral, Daily, Josué Noriega MD, 300 mg at 05/08/25 0852    diazePAM (VALIUM) tablet 5 mg, 5 mg, Oral, Q6H PRN, Josué Noriega MD, 5 mg at 05/08/25 0105    enoxaparin sodium (LOVENOX) syringe 40 mg, 40 mg, Subcutaneous, Daily, Josué Noriega MD    hydrALAZINE (APRESOLINE) tablet 25 mg, 25 mg, Oral, TID, Josué Noriega MD, 25 mg at 05/08/25 0852    HYDROmorphone (DILAUDID) injection 0.25 mg, 0.25 mg, Intravenous, Q4H PRN **AND** naloxone (NARCAN) injection 0.4 mg, 0.4 mg, Intravenous, Q5 Min PRN, Josué Noriega MD    lactated ringers infusion, 9 mL/hr, Intravenous, Continuous, Jesus Ellington MD, Last Rate: 9 mL/hr at 05/07/25 1242, Restarted at 05/07/25 1327    lactated ringers infusion, 9 mL/hr, Intravenous, Continuous, Indra Ruiz CRNA, Stopped at 05/07/25 1622    ondansetron ODT (ZOFRAN-ODT) disintegrating tablet 4 mg, 4 mg, Oral, Q6H PRN **OR** ondansetron (ZOFRAN) injection 4 mg, 4 mg, Intravenous, Q6H PRN, Josué Noriega MD    oxyCODONE-acetaminophen (PERCOCET) 5-325 MG per tablet 2 tablet, 2 tablet, Oral, Q4H PRN, Josué Noriega MD, 2 tablet at 05/08/25 0436    sodium chloride 0.9 % flush 10 mL, 10 mL, Intravenous, PRN, Josué Noriega MD    sodium chloride 0.9 % flush 3 mL, 3 mL, Intravenous, Q12H, Josué Noriega MD, 3 mL at 05/08/25 0854    sodium chloride 0.9 % infusion 40 mL, 40 mL, Intravenous, PRN, Josué Noriega MD    Laboratory Results:        Lab 05/08/25  0544   WBC 12.85*   HEMOGLOBIN 13.0   HEMATOCRIT 39.2   PLATELETS 334   NEUTROS ABS 10.54*   IMMATURE GRANS (ABS) 0.05   LYMPHS ABS 1.56   MONOS ABS 0.67   EOS ABS 0.00   MCV 90.3         Lab 05/08/25  0544   SODIUM 134*   POTASSIUM 4.2   CHLORIDE 100   CO2 24.0   ANION GAP 10.0   BUN 12   CREATININE 0.86   EGFR 74.6   GLUCOSE 133*   CALCIUM 8.7                         Brief Urine Lab Results        None          Microbiology Results (last 10 days)       ** No results found for the last 240 hours. **             Diagnostic Imaging: I personally reviewed and interpreted the new imaging    Assessment and plan:    1. Cervical radiculopathy           This is a 65-year-old female who is status post C6-7 ACDF.  Postoperatively, patient report significant improvement in her upper extremity symptoms.  She remains neurologically intact on exam.  Incision is clean dry and intact.  Pain controlled p.o. pain meds.  Patient denies any significant difficulty swallowing.  X-rays show good hardware placement. We will discharge home today.  She will follow up in the neurosurgery office in 2 weeks.  Activity, restrictions and wound care were discussed the patient.    Any copied data from previous notes included in the (1) HPI, (2) PE, (3) MDM and/or Assessment and Plan has been reviewed and accurate as of 05/08/25.    Yudith Leon PA-C  05/08/25  09:14 EDT

## 2025-05-08 NOTE — DISCHARGE INSTRUCTIONS
Can restart taking aspirin 5 days after surgery.    Please avoid taking any ibuprofen or NSAIDs (Ibuprofen, Aleeve, Motrin, Naproxen) for the next 6 months, as that can inhibit bone fusion.    Patient is to limit activity over her head, and to avoid extending her neck (looking upward) to the best of her ability.  In general we recommend limiting physical activity until first postoperative appointment in 2 weeks.  Do not attempt to lift more than 5-10 pounds. Back of the neck pain is a common postoperative symptom.    Patient may shower 48 hours postoperatively. Do not submerge the incision site. Keep incision as dry as possible. You have sutures that are under the skin that will dissolve on their own.  There is also glue over top of your incision.  Please do not pick at the glue -- it will come off on its own time.     Patient is to contact our office if they she any the following:    Immense difficulty swallowing, choking episodes.  Difficulty breathing.  Choking episodes.  Incision opening/stitches coming apart.  Significant swelling around the incision site. (May attempt to ice incision first.)  Increased redness around the incision site and it becoming warm to the touch.  Signs on infection such as: fever, chills, etc.  It is normal to have a small amount yellow/red discharge for the first week, contact our office if it is excessive (soaking through entire bandages) or cloudy in nature.  Excessive pain.  The above list is not exhaustive.

## 2025-05-08 NOTE — PLAN OF CARE
Goal Outcome Evaluation:  Plan of Care Reviewed With: patient        Progress: no change  Outcome Evaluation: PT initial evaluation complete. Pt presents w/ generalized weakness, impaired functional endurance, and pain this date. Pt was educated on cervical spinal precautions, HEP reviewed, and handout was provided. In addition pt ambulated 350ft w/ CGAX1, no AD, 2L NC, and a close chair follow. No LOB or buckling noted however pt required VC for spinal precautions throughout. In addition O2 sats read 89% upon re-entering the room w/ 2L NC on. IPPT services indicated to address deficits listed above. D/c rec is home w/ assist when medically ready for discharge.    Anticipated Discharge Disposition (PT): home with assist

## 2025-05-08 NOTE — THERAPY EVALUATION
Patient Name: Mandi Baldwin  : 1960    MRN: [id number]                              Today's Date: 2025       Admit Date: 2025    Visit Dx:     ICD-10-CM ICD-9-CM   1. Cervical radiculopathy  M54.12 723.4     Patient Active Problem List   Diagnosis    Encounter for screening for malignant neoplasm of colon    Multiple pulmonary nodules (Two stable right sided nodules)    Lingular infiltrate    Stage III (Severe) COPD    Tobacco abuse    RAMIRES (dyspnea on exertion)    Chronic cough    Cervical radiculopathy     Past Medical History:   Diagnosis Date    Bulging of cervical intervertebral disc     Cervical disc disorder     COPD (chronic obstructive pulmonary disease)     Emphysema of lung     Glaucoma     Hypertension     Urinary tract infection      Past Surgical History:   Procedure Laterality Date    ANTERIOR CERVICAL DISCECTOMY W/ FUSION N/A 2025    Procedure: CERVICAL DISCECTOMY ANTERIOR WITH FUSION C6-7;  Surgeon: Josué Noriega MD;  Location: [facility] OR;  Service: Neurosurgery;  Laterality: N/A;    AUGMENTATION MAMMAPLASTY      redone in  due to encapsulation    CHOLECYSTECTOMY      COLONOSCOPY N/A 2023    Procedure: COLONOSCOPY;  Surgeon: Cuong Rush MD;  Location: [facility] OR;  Service: Gastroenterology;  Laterality: N/A;    EYE SURGERY      TONSILLECTOMY        General Information       Row Name 25 0927          OT Time and Intention    Document Type evaluation  -AJ     Mode of Treatment occupational therapy  -AJ       Row Name 25 0927          General Information    Patient Profile Reviewed yes  -AJ     Prior Level of Function independent:;all household mobility;gait;transfer;bed mobility;ADL's  Denies DME use, denies recent falls.  -AJ     Existing Precautions/Restrictions other (see comments);fall;oxygen therapy device and L/min  Cervical precautions, not on 02 at baseline.  -AJ     Barriers to Rehab none identified  -       Row Name  05/08/25 0927          Living Environment    Current Living Arrangements home;other (see comments)  Plannning to stay w/mother at d/c  -     People in Home alone  -       Row Name 05/08/25 0927          Home Main Entrance    Number of Stairs, Main Entrance three  -AJ     Stair Railings, Main Entrance none  -       Row Name 05/08/25 0927          Stairs Within Home, Primary    Number of Stairs, Within Home, Primary none  -       Row Name 05/08/25 0927          Cognition    Orientation Status (Cognition) oriented x 3  -       Row Name 05/08/25 0927          Safety Issues/Impairments Affecting Functional Mobility    Safety Issues Affecting Function (Mobility) insight into deficits/self-awareness;safety precaution awareness;safety precautions follow-through/compliance  -     Impairments Affecting Function (Mobility) endurance/activity tolerance;pain;shortness of breath;strength  -               User Key  (r) = Recorded By, (t) = Taken By, (c) = Cosigned By      Initials Name Provider Type    AJ Adia Brito OT Occupational Therapist                     Mobility/ADL's       Row Name 05/08/25 0929          Bed Mobility    Bed Mobility supine-sit  -     Supine-Sit [place] (Bed Mobility) supervision;1 person assist  -     Assistive Device (Bed Mobility) bed rails;head of bed elevated  -     Comment, (Bed Mobility) Cues for maintaining cervical precautions  -       Row Name 05/08/25 0929          Transfers    Transfers sit-stand transfer  -       Row Name 05/08/25 0929          Sit-Stand Transfer    Sit-Stand [place] (Transfers) contact guard;2 person assist;verbal cues  -     Assistive Device (Sit-Stand Transfers) other (see comments)  No AD  -       Row Name 05/08/25 0929          Functional Mobility    Functional Mobility- Comment Defer to PT for mobility details  -       Row Name 05/08/25 0929          Activities of Daily Living    BADL Assessment/Intervention  grooming;toileting;upper body dressing;bathing;lower body dressing  -       Row Name 05/08/25 0929          Hygiene Care    Oral Care teeth brushed - regular toothbrush  -       Row Name 05/08/25 0929          Grooming Assessment/Training    [place] Level (Grooming) oral care regimen;wash face, hands;standby assist  -     Position (Grooming) sink side;supported standing  -       Row Name 05/08/25 0929          Toileting Assessment/Training    [place] Level (Toileting) adjust/manage clothing;perform perineal hygiene;standby assist  -     Assistive Devices (Toileting) commode;grab bar/safety frame  -     Position (Toileting) unsupported sitting  -       Row Name 05/08/25 0929          Upper Body Dressing Assessment/Training    [place] Level (Upper Body Dressing) don;front opening garment;set up  -     Position (Upper Body Dressing) edge of bed sitting  -       Row Name 05/08/25 0929          Bathing Assessment/Intervention    [place] Level (Bathing) bathing skills  -     Comment, (Bathing) Implemented and educated on use of LH sponge to increase independence w/bathing while maintaining cervial precautions.  -       Row Name 05/08/25 0929          Lower Body Dressing Assessment/Training    [place] Level (Lower Body Dressing) don;socks;minimum assist (75% patient effort);verbal cues  -     Assistive Devices (Lower Body Dressing) long-handled shoe horn;reacher;sock-aid  -     Position (Lower Body Dressing) supported sitting  -     Comment, (Lower Body Dressing) Implemented and educated on use of AE to increase ind with LBD while maintaining cervical precautions.  -               User Key  (r) = Recorded By, (t) = Taken By, (c) = Cosigned By      Initials Name Provider Type    Adia Avalos OT Occupational Therapist                   Obj/Interventions       Row Name 05/08/25 0959          Sensory Assessment (Somatosensory)    Sensory Assessment (Somatosensory)  UE sensation intact  -     Sensory Assessment Reports symmetrical general sensation and able to localize light touch in BUE.  -       Row Name 05/08/25 0959          Range of Motion Comprehensive    General Range of Motion bilateral upper extremity ROM WFL  -       Row Name 05/08/25 0959          Strength Comprehensive (MMT)    General Manual Muscle Testing (MMT) Assessment upper extremity strength deficits identified  -     Comment, General Manual Muscle Testing (MMT) Assessment BUE WFL for mobility and ADLs  -       Row Name 05/08/25 0959          Balance    Balance Assessment sitting static balance;sitting dynamic balance;sit to stand dynamic balance;standing dynamic balance;standing static balance  -     Static Sitting Balance independent  -     Dynamic Sitting Balance supervision  -     Position, Sitting Balance unsupported;sitting edge of bed  -     Static Standing Balance standby assist  -     Dynamic Standing Balance contact guard  -     Position/Device Used, Standing Balance unsupported  -     Balance Interventions sitting;standing;sit to stand;supported;static;dynamic;occupation based/functional task  -               User Key  (r) = Recorded By, (t) = Taken By, (c) = Cosigned By      Initials Name Provider Type    AJ Adia Brito OT Occupational Therapist                   Goals/Plan       Row Name 05/08/25 1005          Bed Mobility Goal 1 (OT)    Activity/Assistive Device (Bed Mobility Goal 1, OT) sit to supine;supine to sit  -     [place] Level/Cues Needed (Bed Mobility Goal 1, OT) independent  -     Time Frame (Bed Mobility Goal 1, OT) long term goal (LTG);5 days  -     Progress/Outcomes (Bed Mobility Goal 1, OT) new goal  -       Row Name 05/08/25 1005          Transfer Goal 1 (OT)    Activity/Assistive Device (Transfer Goal 1, OT) sit-to-stand/stand-to-sit;bed-to-chair/chair-to-bed;toilet  -     [place] Level/Cues Needed (Transfer Goal 1, OT)  independent  -AJ     Time Frame (Transfer Goal 1, OT) long term goal (LTG);5 days  -AJ     Progress/Outcome (Transfer Goal 1, OT) new goal  -AJ       Row Name 05/08/25 1005          Dressing Goal 1 (OT)    Activity/Device (Dressing Goal 1, OT) upper body dressing;lower body dressing  -AJ     [place]/Cues Needed (Dressing Goal 1, OT) contact guard required  -AJ     Time Frame (Dressing Goal 1, OT) short term goal (STG);2 days  -AJ     Strategies/Barriers (Dressing Goal 1, OT) Maintaining cervical precautions  -AJ     Progress/Outcome (Dressing Goal 1, OT) new goal  -AJ       Row Name 05/08/25 1005          Therapy Assessment/Plan (OT)    Planned Therapy Interventions (OT) activity tolerance training;adaptive equipment training;BADL retraining;functional balance retraining;IADL retraining;occupation/activity based interventions;patient/caregiver education/training;ROM/therapeutic exercise;strengthening exercise;transfer/mobility retraining  -AJ               User Key  (r) = Recorded By, (t) = Taken By, (c) = Cosigned By      Initials Name Provider Type    Adia Avalos, OT Occupational Therapist                   Clinical Impression       Row Name 05/08/25 1001          Pain Assessment    Pretreatment Pain Rating 2/10  -AJ     Posttreatment Pain Rating 2/10  -AJ     Pain Location neck  -AJ     Pain Side/Orientation generalized  -AJ     Pain Management Interventions activity modification encouraged;exercise or physical activity utilized;positioning techniques utilized  -AJ     Response to Pain Interventions activity participation with tolerable pain  -AJ       Row Name 05/08/25 1001          Plan of Care Review    Plan of Care Reviewed With patient;daughter  -AJ     Progress no change  -AJ     Outcome Evaluation OT eval complete. Pt presents below fxl baseline for ADLs and mobility, limited by cervical precautions/restrictions s/p discectomy and fusion, decreased activity tolerance, and strength. Pt performed  bed mobility, toileting, and sinkside grooming routine with SBA-CGA and cues for maintaining precautions. Pt educated on AE to increase independence w/ADLs while maintaining precautions. Pt would benefit from ongoing skilled OT services to progress to PLOF. Rec d/c to home w/assist.  -       Row Name 05/08/25 1001          Therapy Assessment/Plan (OT)    Patient/Family Therapy Goal Statement (OT) Return to PLOF  -     Rehab Potential (OT) good  -AJ     Criteria for Skilled Therapeutic Interventions Met (OT) yes;meets criteria;skilled treatment is necessary  -     Therapy Frequency (OT) daily  -AJ     Predicted Duration of Therapy Intervention (OT) 5 days  -AJ       Row Name 05/08/25 1001          Therapy Plan Review/Discharge Plan (OT)    Anticipated Discharge Disposition (OT) home with assist  -       Row Name 05/08/25 1001          Vital Signs    Pre Systolic BP Rehab 130  -AJ     Pre Treatment Diastolic BP 64  -AJ     Post Systolic BP Rehab --  left w/PT  -AJ     Pre SpO2 (%) 84  notified nursing and placed back on 2LNC  -AJ     O2 Delivery Pre Treatment room air  -AJ     Intra SpO2 (%) 89  -AJ     O2 Delivery Intra Treatment nasal cannula  -AJ     Post SpO2 (%) 94  -AJ     O2 Delivery Post Treatment nasal cannula  -AJ     Pre Patient Position Sitting  -AJ     Intra Patient Position Standing  -AJ     Post Patient Position Sitting  -AJ       Row Name 05/08/25 1001          Positioning and Restraints    Pre-Treatment Position in bed  -AJ     Post Treatment Position chair  -AJ     In Chair notified nsg;with PT;sitting;call light within reach;encouraged to call for assist;exit alarm on  -AJ               User Key  (r) = Recorded By, (t) = Taken By, (c) = Cosigned By      Initials Name Provider Type    Adia Avalos, OT Occupational Therapist                   Outcome Measures       Row Name 05/08/25 1006          How much help from another is currently needed...    Putting on and taking off regular lower  body clothing? 3  -AJ     Bathing (including washing, rinsing, and drying) 3  -AJ     Toileting (which includes using toilet bed pan or urinal) 4  -AJ     Putting on and taking off regular upper body clothing 4  -AJ     Taking care of personal grooming (such as brushing teeth) 4  -AJ     Eating meals 4  -AJ     AM-PAC 6 Clicks Score (OT) 22  -AJ       Row Name 05/08/25 1027 05/08/25 0800       How much help from another person do you currently need...    Turning from your back to your side while in flat bed without using bedrails? 4  -AC 4  -SHOBHA    Moving from lying on back to sitting on the side of a flat bed without bedrails? 4  -AC 4  -SHOBHA    Moving to and from a bed to a chair (including a wheelchair)? 3  -AC 3  -SHOBHA    Standing up from a chair using your arms (e.g., wheelchair, bedside chair)? 3  -AC 3  -SHOBHA    Climbing 3-5 steps with a railing? 3  -AC 3  -SHOBHA    To walk in hospital room? 3  -AC 3  -SHOBHA    AM-PAC 6 Clicks Score (PT) 20  -AC 20  -SHOBHA    Highest Level of Mobility Goal 6 --> Walk 10 steps or more  -AC 6 --> Walk 10 steps or more  -SHOBHA      Row Name 05/08/25 1027 05/08/25 1006       Functional Assessment    Outcome Measure Options AM-PAC 6 Clicks Basic Mobility (PT)  - AM-PAC 6 Clicks Daily Activity (OT)  -              User Key  (r) = Recorded By, (t) = Taken By, (c) = Cosigned By      Initials Name Provider Type    SHOBHA Christine De Los Santos RN Registered Nurse    Bethany Barahona, PT Physical Therapist    Adia Avalos, LINDSEY Occupational Therapist                    Occupational Therapy Education       Title: PT OT SLP Therapies (In Progress)       Topic: Occupational Therapy (In Progress)       Point: ADL training (Done)       Learning Progress Summary            Patient Acceptance, E,D, VU by BALTAZAR at 5/8/2025 1050   Family Acceptance, E,D, VU by BALTAZAR at 5/8/2025 1050                      Point: Precautions (Done)       Learning Progress Summary            Patient Acceptance, E,D, VU by BALTAZAR at 5/8/2025  1050   Family Acceptance, E,D, VU by BALTAZAR at 5/8/2025 1050                      Point: Body mechanics (Done)       Learning Progress Summary            Patient Acceptance, E,D, VU by BALTAZAR at 5/8/2025 1050   Family Acceptance, E,D, VU by BALTAZAR at 5/8/2025 1050                                      User Key       Initials Effective Dates Name Provider Type Discipline     08/26/24 -  Adia Brito, OT Occupational Therapist OT                  OT Recommendation and Plan  Planned Therapy Interventions (OT): activity tolerance training, adaptive equipment training, BADL retraining, functional balance retraining, IADL retraining, occupation/activity based interventions, patient/caregiver education/training, ROM/therapeutic exercise, strengthening exercise, transfer/mobility retraining  Therapy Frequency (OT): daily  Plan of Care Review  Plan of Care Reviewed With: patient, daughter  Progress: no change  Outcome Evaluation: OT eval complete. Pt presents below fxl baseline for ADLs and mobility, limited by cervical precautions/restrictions s/p discectomy and fusion, decreased activity tolerance, and strength. Pt performed bed mobility, toileting, and sinkside grooming routine with SBA-CGA and cues for maintaining precautions. Pt educated on AE to increase independence w/ADLs while maintaining precautions. Pt would benefit from ongoing skilled OT services to progress to PLOF. Rec d/c to home w/assist.     Time Calculation:   Evaluation Complexity (OT)  Review Occupational Profile/Medical/Therapy History Complexity: brief/low complexity  Assessment, Occupational Performance/Identification of Deficit Complexity: 1-3 performance deficits  Clinical Decision Making Complexity (OT): problem focused assessment/low complexity  Overall Complexity of Evaluation (OT): low complexity     Time Calculation- OT       Row Name 05/08/25 1050             Time Calculation- OT    OT Start Time 0855  -      OT Received On 05/08/25  -      OT  Goal Re-Cert Due Date 05/18/25  -AJ         Untimed Charges    OT Eval/Re-eval Minutes 52  -AJ         Total Minutes    Untimed Charges Total Minutes 52  -AJ       Total Minutes 52  -AJ                User Key  (r) = Recorded By, (t) = Taken By, (c) = Cosigned By      Initials Name Provider Type    AJ Adia Brito OT Occupational Therapist                  Therapy Charges for Today       Code Description Service Date Service Provider Modifiers Qty    [id number] HC OT EVAL LOW COMPLEXITY 4 5/8/2025 Adia Brito OT GO 1                 Adia Brito OT  5/8/2025

## 2025-05-08 NOTE — PLAN OF CARE
Goal Outcome Evaluation:  Plan of Care Reviewed With: patient        Progress: improving  Outcome Evaluation: DC'd home

## 2025-05-08 NOTE — CASE MANAGEMENT/SOCIAL WORK
Discharge Planning Assessment  [facility]     Patient Name: Mandi Baldwin  MRN: [id number]  Today's Date: 5/8/2025    Admit Date: 5/7/2025    Plan: Home with family assistance, Home Oxygen and a rolling walker from [employer]       Discharge Plan       Row Name 05/08/25 1140       Plan    Plan Home with family assistance, Home Oxygen and a rolling walker from [employer]    Patient/Family in Agreement with Plan yes    Plan Comments Met with Ms. Baldwin, and her daughter, Urszula, at the bedside, for discharge planning.    Ms. Baldwin lives alone, in [facility].    She states that prior to admission, she ambulated independently, with no AD, and was independent with her ADL's.  3 steps to enter her home.  She states that she has gone to [facility] in the past, but no IPR or HH.    She has been evaluated by PT and OT and recommendation is for home.    Per bedside RN, the patient will require home O2, as she is unable to wean her from the 2L per [place] continuous O2.  The patient also requested a rolling walker for home use.  She does not have preference for DME company.  Contacted Bhupendra with [employer] and he accepted the patient for DME services.  [employer] will deliver the portable oxygen concentrator and rolling walker to the hospital room, prior to discharge.    PCP is Luís Coon.  Insurance is Medicare and [employer] with prescription drug coverage.  No ACP documents.    DC plan is to go to her Mother's home, which is about a mile from the patient's home, with her Mother and Daughter's assistance.  Ms. Baldwin' daughter is transporting her home when discharged.    CM will continue to follow.    Final Discharge Disposition Code 01 - home or self-care                  Continued Care and Services - Admitted Since 5/7/2025       Durable Medical Equipment       Service Provider Request Status Services Address Phone Fax Patient Preferred    [facility] [place]  Selected Durable Medical Equipment, Oxygen  [facility] [address] [facility] [address] [phone] --                  Expected Discharge Date and Time       Expected Discharge Date Expected Discharge Time    May 8, 2025             Marva Bland RN

## 2025-05-08 NOTE — DISCHARGE SUMMARY
[facility]    Date of Admission: 5/7/2025  Date of Discharge: 5/8/2025    Discharge Diagnosis:   Cervical radiculopathy [M54.12]     Procedures Performed:  Procedure(s):  CERVICAL DISCECTOMY ANTERIOR WITH FUSION C6-7       Presenting Problem/History of Present Illness  Cervical radiculopathy [M54.12]     Hospital Course:  Patient is a 65 y.o. female presented with neck and left arm pain from a disc herniation at C6-7.  As such, Dr. Noriega recommended C6-7 ACDF.  Patient underwent procedure on 5/7/2025 which went without complications.  Postoperatively, patient has remained neurologically intact on exam and report significant improvement in her upper extremity pain.  Her incision is clean dry and intact.  Denies any significant difficulty swallowing.  Pain controlled with p.o. pain meds.  She will discharge home today in satisfactory condition.  She will follow-up in the neurosurgery office in 2 weeks for wound check.  Activity, restrictions and wound care discussed with patient.    Condition on Discharge: Satisfactory    Discharge Disposition:  Home or Self Care    Discharge Medications     Discharge Medications        New Medications        Instructions Start Date   diazePAM 5 MG tablet  Commonly known as: VALIUM   5 mg, Oral, Every 8 Hours PRN      naloxone 4 MG/0.1ML nasal spray  Commonly known as: NARCAN   Call 911. Don't prime. [place] in 1 nostril for overdose. Repeat in 2-3 minutes in other nostril if no or minimal breathing/responsiveness.      oxyCODONE-acetaminophen 5-325 MG per tablet  Commonly known as: Percocet   1 tablet, Oral, Every 8 Hours PRN      sennosides-docusate 8.6-50 MG per tablet  Commonly known as: PERICOLACE   1 tablet, Oral, Daily, Take while using oxycodone to prevent constipation             Continue These Medications        Instructions Start Date   albuterol sulfate  (90 Base) MCG/ACT inhaler  Commonly known as: PROVENTIL  HFA;VENTOLIN HFA;PROAIR HFA   2 puffs, Inhalation, Every 4 Hours PRN      bisoprolol-hydrochlorothiazide 5-6.25 MG per tablet  Commonly known as: ZIAC   1 tablet, Daily      budesonide-formoterol 160-4.5 MCG/ACT inhaler  Commonly known as: SYMBICORT   2 puffs, Inhalation, 2 Times Daily - RT      buPROPion  MG 24 hr tablet  Commonly known as: WELLBUTRIN XL   1 tablet, Daily      hydrALAZINE 25 MG tablet  Commonly known as: APRESOLINE   25 mg, 3 Times Daily      ondansetron 4 MG tablet  Commonly known as: ZOFRAN   4 mg, Every 8 Hours PRN      Timolol Maleate (Once-Daily) 0.5 % solution   INSTILL 1 DROP INTO BOTH EYES EVERY MORNING             Stop These Medications      aspirin 81 MG EC tablet     citalopram 20 MG tablet  Commonly known as: CeleXA     HYDROcodone-acetaminophen 5-325 MG per tablet  Commonly known as: NORCO     nicotine 21 MG/24HR patch  Commonly known as: NICODERM CQ                Follow-up Appointments:   Follow-up Information       Luís Coon MD .    Specialty: Internal Medicine  Contact information:  [address]  [facility] [address] [phone]               Yudith Leon PA-C Follow up in 2 week(s).    Specialty: Neurosurgery  Contact information:  [facility]  [address]  [facility] [address] [phone]                               Yudith Leon PA-C  05/08/25  09:20 EDT

## 2025-05-22 ENCOUNTER — TELEPHONE (OUTPATIENT)
Dept: NEUROSURGERY | Facility: CLINIC | Age: 65
End: 2025-05-22

## 2025-05-22 NOTE — TELEPHONE ENCOUNTER
Call transferred from the HUB. Pt needs to reschedule her 11:00 PO apt with Yudith Leon PA-C today and wants to be seen in [place]. Per Coty she need to keep the appt. Per patient her car is broken down and she can't make it. Coty will give her a call.

## 2025-05-25 ENCOUNTER — HOSPITAL ENCOUNTER (EMERGENCY)
Facility: HOSPITAL | Age: 65
Discharge: LEFT BEFORE DISCHARGE | End: 2025-05-25
Payer: MEDICARE

## 2025-05-25 VITALS
HEART RATE: 77 BPM | DIASTOLIC BLOOD PRESSURE: 72 MMHG | OXYGEN SATURATION: 96 % | TEMPERATURE: 98.2 F | RESPIRATION RATE: 18 BRPM | WEIGHT: 150 LBS | HEIGHT: 63 IN | BODY MASS INDEX: 26.58 KG/M2 | SYSTOLIC BLOOD PRESSURE: 160 MMHG

## 2025-05-25 PROCEDURE — 99211 OFF/OP EST MAY X REQ PHY/QHP: CPT

## 2025-06-05 ENCOUNTER — OFFICE VISIT (OUTPATIENT)
Dept: NEUROSURGERY | Facility: CLINIC | Age: 65
End: 2025-06-05
Payer: MEDICARE

## 2025-06-05 ENCOUNTER — HOSPITAL ENCOUNTER (OUTPATIENT)
Dept: GENERAL RADIOLOGY | Facility: HOSPITAL | Age: 65
Discharge: HOME | End: 2025-06-05
Payer: MEDICARE

## 2025-06-05 VITALS — WEIGHT: 140 LBS | TEMPERATURE: 98 F | BODY MASS INDEX: 24.8 KG/M2 | HEIGHT: 63 IN

## 2025-06-05 DIAGNOSIS — Z98.1 S/P CERVICAL SPINAL FUSION: Primary | ICD-10-CM

## 2025-06-05 PROCEDURE — 72040 X-RAY EXAM NECK SPINE 2-3 VW: CPT

## 2025-06-05 NOTE — PROGRESS NOTES
"NEUROSURGERY PROGRESS NOTE    Patient: Mandi Baldwin  : 1960    Primary Care Provider: Luís Coon MD    Chief Complaint: Status post cervical fusion    Subjective: This is a 65-year-old female who who is 4 weeks status post C6-7 ACDF.  Postoperatively, she reports significant improvement in her left arm symptoms and she is overall pleased with her progress.  The numbness and tingling in her left hand has improved though she continues to have some in her index finger.  She has had a couple episodes in which she has had some difficulty swallowing though this is overall improved.  Her incision is healing well.  She did have a fall recently which led to an ER visit and stitches in her forehead though denies any worsening neck pain or arm pain.    Objective    Vital Signs: Temperature 98 °F (36.7 °C), temperature source Infrared, height 160 cm (63\"), weight 63.5 kg (140 lb).    Physical Exam  Awake, alert and oriented x 3  Opens eyes spont  Pupils 3 mm rx bilat  Extraocular muscles intact bilaterally  Face symmetric bilaterally  Tongue midline  5/5 in all 4 ext  No pronator drift    Current Medications:   Current Outpatient Medications:     bisoprolol-hydrochlorothiazide (ZIAC) 5-6.25 MG per tablet, Take 1 tablet by mouth Daily., Disp: , Rfl:     budesonide-formoterol (SYMBICORT) 160-4.5 MCG/ACT inhaler, Inhale 2 puffs 2 (Two) Times a Day., Disp: 1 inhaler, Rfl: 0    buPROPion XL (WELLBUTRIN XL) 300 MG 24 hr tablet, Take 1 tablet by mouth Daily., Disp: , Rfl:     diazePAM (VALIUM) 5 MG tablet, Take 1 tablet by mouth Every 8 (Eight) Hours As Needed for Muscle Spasms., Disp: 20 tablet, Rfl: 0    hydrALAZINE (APRESOLINE) 25 MG tablet, Take 1 tablet by mouth 3 (Three) Times a Day., Disp: , Rfl:     ondansetron (ZOFRAN) 4 MG tablet, Take 1 tablet by mouth Every 8 (Eight) Hours As Needed. for nausea, Disp: , Rfl:     Timolol Maleate, Once-Daily, 0.5 % solution, INSTILL 1 DROP INTO BOTH EYES EVERY MORNING, " Disp: , Rfl:     albuterol (PROVENTIL HFA;VENTOLIN HFA) 108 (90 Base) MCG/ACT inhaler, Inhale 2 puffs Every 4 (Four) Hours As Needed for Wheezing. (Patient not taking: Reported on 6/5/2025), Disp: 1 inhaler, Rfl: 0    naloxone (NARCAN) 4 MG/0.1ML nasal spray, Call 911. Don't prime. [place] in 1 nostril for overdose. Repeat in 2-3 minutes in other nostril if no or minimal breathing/responsiveness. (Patient not taking: Reported on 6/5/2025), Disp: 2 each, Rfl: 0     Laboratory Results:                              Brief Urine Lab Results       None          Microbiology Results (last 10 days)       ** No results found for the last 240 hours. **            Diagnostic Imaging: I reviewed and independently interpreted the new imaging.     Assessment/Plan:  This is a 65-year-old female who is 4 weeks status post C6-7 ACDF.  Postoperatively, she reports significant improvement in her upper extremity symptoms.  She initially had a couple episodes of difficulty swallowing however this is essentially resolved.  Incision is healing well.  She is neurologically intact with full strength of bilateral upper extremities.  Activity, restrictions and wound care discussed with patient.  She gradually increase her lifting restriction by 5 pounds a week.  I have sent her to physical therapy.  I have also ordered x-rays that she will get done today.  She will follow-up in the neurosurgery office in 4 weeks.  If she is doing well, it will likely be her last visit.    Diagnoses and all orders for this visit:    1. S/P cervical spinal fusion (Primary)  -     XR Spine Cervical 2 View  -     Ambulatory Referral to Physical Therapy for Evaluation & Treatment        Yudith Leon PA-C  06/05/25  14:17 EDT

## 2025-06-06 ENCOUNTER — DOCUMENTATION (OUTPATIENT)
Dept: NEUROSURGERY | Facility: CLINIC | Age: 65
End: 2025-06-06
Payer: COMMERCIAL

## 2025-06-06 NOTE — PROGRESS NOTES
Tried to call patient regarding her cervical x-rays.  Was sent to voicemail and voicemail box is full.  X-rays show good hardware placement with no new issues.  She will follow-up in the office in 4 weeks.

## 2025-06-26 ENCOUNTER — HOSPITAL ENCOUNTER (OUTPATIENT)
Dept: PHYSICAL THERAPY | Facility: HOSPITAL | Age: 65
Setting detail: THERAPIES SERIES
Discharge: HOME | End: 2025-06-26
Payer: MEDICARE

## 2025-06-26 DIAGNOSIS — Z98.1 S/P CERVICAL SPINAL FUSION: Primary | ICD-10-CM

## 2025-06-26 DIAGNOSIS — M53.82 DECREASED ROM OF INTERVERTEBRAL DISCS OF CERVICAL SPINE: ICD-10-CM

## 2025-06-26 PROCEDURE — 97140 MANUAL THERAPY 1/> REGIONS: CPT | Performed by: PHYSICAL THERAPIST

## 2025-06-26 PROCEDURE — G0283 ELEC STIM OTHER THAN WOUND: HCPCS | Performed by: PHYSICAL THERAPIST

## 2025-06-26 PROCEDURE — 97162 PT EVAL MOD COMPLEX 30 MIN: CPT | Performed by: PHYSICAL THERAPIST

## 2025-06-26 NOTE — THERAPY EVALUATION
"    Physical Therapy Initial Evaluation and Plan of Care    Patient: Mandi Baldwin   : 1960  Referring practitioner: Yudith Leon PA-C  Date of Initial Visit: 2025  Today's Date: 2025  Patient seen for 1 session         Visit Diagnoses:    ICD-10-CM ICD-9-CM   1. S/P cervical spinal fusion  Z98.1 V45.4   2. Decreased ROM of intervertebral discs of cervical spine  M53.82 724.9         Subjective Questionnaire: NDI:34% impaired      Subjective Evaluation    History of Present Illness  Onset date: Over a year.  Date of surgery: 2025  Mechanism of injury: Pt reports having neck surgery for over a year. She states she had two bulging discs and underwent a cervical fusion on 25. The patient reports she has numbness in her left middle finger and reports, \"My head feels real heavy and hard to hold up\". She states she has difficulty sleeping due to pain. The patient reports she has been referred to physical therapy for further evaluation.      Patient Occupation: Retired Quality of life: good    Pain  Current pain ratin  At best pain ratin  Location: Neck  Quality: dull ache  Relieving factors: heat and medications  Aggravating factors: outstretched reach, repetitive movement, sleeping, movement, lifting and overhead activity  Progression: improved    Social Support  Lives with: alone    Hand dominance: right    Patient Goals  Patient goals for therapy: decreased pain, increased motion, increased strength, independence with ADLs/IADLs, return to sport/leisure activities and decreased edema             Objective          Palpation   Left   No palpable tenderness to the deltoid, intercostals, latissimus, lower trapezius, pectoralis major, pectoralis minor, scalenes and sternocleidomastoid.   Muscle spasm in the cervical paraspinals, levator scapulae, middle trapezius, suboccipitals and upper trapezius.   Tenderness of the cervical interspinals, cervical paraspinals, levator scapulae, " middle trapezius, rhomboids, suboccipitals and upper trapezius.     Right   No palpable tenderness to the deltoid, intercostals, latissimus, lower trapezius, pectoralis major, pectoralis minor, scalenes and sternocleidomastoid.   Muscle spasm in the cervical paraspinals, levator scapulae, middle trapezius, suboccipitals and upper trapezius. Tenderness of the cervical interspinals, cervical paraspinals, levator scapulae, middle trapezius, rhomboids, suboccipitals and upper trapezius.     Tenderness   Cervical Spine   Tenderness in the spinous process, left scapula, left transverse process, right scapula and right transverse process.     Neurological Testing     Sensation   Cervical/Thoracic   Left   Diminished: light touch    Right   Intact: light touch    Additional Neurological Details  Diminished left middle finger, numbness.    Active Range of Motion   Cervical/Thoracic Spine   Cervical    Flexion: 21 degrees   Extension: 21 degrees   Left lateral flexion: 24 degrees   Right lateral flexion: 20 degrees   Left rotation: 35 degrees   Right rotation: 40 degrees     Strength/Myotome Testing     Left Shoulder     Planes of Motion   Flexion: 4   Abduction: 4     Right Shoulder     Planes of Motion   Flexion: 4   Abduction: 4     Left Elbow   Flexion: 4  Extension: 4    Right Elbow   Flexion: 4  Extension: 4    Tests   Cervical     Left   Negative ULTT1, ULTT3 and ULTT4.     Right   Negative ULTT1, ULTT3 and ULTT4.           Assessment & Plan       Assessment  Impairments: abnormal muscle tone, abnormal or restricted ROM, activity intolerance, impaired balance, impaired physical strength, lacks appropriate home exercise program, pain with function and safety issue   Functional limitations: carrying objects, lifting, sleeping, pulling, pushing, uncomfortable because of pain, moving in bed, reaching behind back, reaching overhead and unable to perform repetitive tasks   Prognosis: good    Goals  Plan Goals: SHORT TERM  GOALS:     2 weeks  1. Pt will be instructed in HEP for improved independence.  2. Pt to demonstrate a ten degree improvement in cervical AROM to allow for improved ability to perform ADL's.  3. Pt to report the ability to sleep through the night with no more than 2 interruptions due to pain.    LONG TERM GOALS:   6 weeks  1. Pt to demonstrate 75% cervical AROM for improved safety when driving.  2. Pt to demonstrate ability to lift perform daily tasks with no more than 2/10 cervical pain for improved independence and QOL.  3. Pt will report less than 10% impairment on the NDI for improved function.      Plan  Therapy options: will be seen for skilled therapy services  Planned modality interventions: dry needling, cryotherapy, electrical stimulation/Russian stimulation, iontophoresis, thermotherapy (hydrocollator packs) and ultrasound  Planned therapy interventions: abdominal trunk stabilization, ADL retraining, fine motor coordination training, flexibility, functional ROM exercises, home exercise program, joint mobilization, manual therapy, motor coordination training, postural training, soft tissue mobilization, spinal/joint mobilization, strengthening, stretching and therapeutic activities  Frequency: 2x week  Duration in weeks: 12  Treatment plan discussed with: patient  Plan details: Pt will be re-assessed upon follow up for tolerance to pain relieving exercise program.     Moderate Evaluation  26118  Re-evaluation   12131    Therapeutic exercise  66349  Therapeutic activity    19435  Neuromuscular re-education   28486  Manual therapy   34524    Attended e-stim  76170  Unattended e-stim (Medicaid/Medicare)     Moist heat/cryotherapy 94370   Ultrasound   81406  Iontophoresis   93071            Timed:         Manual Therapy:   10      mins  02463;     Therapeutic Exercise:         mins  76500;     Neuromuscular Amena:        mins  16629;    Therapeutic Activity:          mins  95318;     Gait Training:            mins  77724;     Ultrasound:          mins  87624;    Ionto                                   mins   02459  Self Care                            mins   17229      Un-Timed:  Electrical Stimulation:    10     mins  97152 ( );  Dry Needling          mins self-pay  Traction          mins 36391  Low Eval          Mins 76214  Mod Eval     30     Mins 79229  High Eval                            Mins 84295  Re-Eval          Mins 43398  Canalith Repos         mins 15670      Timed Treatment:   10   mins   Total Treatment:     50   mins          PT: Ashley Claudene Dalton, PT     License Number: [id number]  Electronically signed by Ashley Claudene Dalton, PT, 06/26/25, 2:25 PM EDT    Certification Period: 6/26/2025 thru 9/23/2025  I certify that the therapy services are furnished while this patient is under my care.  The services outlined above are required by this patient, and will be reviewed every 90 days.         Physician Signature:__________________________________________________    PHYSICIAN: Yudith Leon PA-C  NPI: [id number]                                      DATE:      Please sign and return via fax to [email or web address] . Thank you, [facility].

## 2025-07-01 ENCOUNTER — HOSPITAL ENCOUNTER (OUTPATIENT)
Dept: PHYSICAL THERAPY | Facility: HOSPITAL | Age: 65
Setting detail: THERAPIES SERIES
Discharge: HOME | End: 2025-07-01
Payer: MEDICARE

## 2025-07-01 DIAGNOSIS — Z98.1 S/P CERVICAL SPINAL FUSION: Primary | ICD-10-CM

## 2025-07-01 DIAGNOSIS — M53.82 DECREASED ROM OF INTERVERTEBRAL DISCS OF CERVICAL SPINE: ICD-10-CM

## 2025-07-01 PROCEDURE — G0283 ELEC STIM OTHER THAN WOUND: HCPCS | Performed by: PHYSICAL THERAPIST

## 2025-07-01 PROCEDURE — 97110 THERAPEUTIC EXERCISES: CPT | Performed by: PHYSICAL THERAPIST

## 2025-07-01 NOTE — PROGRESS NOTES
Physical Therapy Daily Treatment Note      Patient: Mandi Baldwin   : 1960  Referring practitioner: Yudith Leon PA-C  Date of Initial Visit: Type: THERAPY  Episode: Cervical spinal fusion  Today's Date: 2025  Patient seen for 2 sessions       Visit Diagnoses:    ICD-10-CM ICD-9-CM   1. S/P cervical spinal fusion  Z98.1 V45.4   2. Decreased ROM of intervertebral discs of cervical spine  M53.82 724.9       Subjective Evaluation    History of Present Illness    Subjective comment: Patient reports 5/10 pain today.       Objective   See Exercise, Manual, and Modality Logs for complete treatment.       Assessment & Plan       Assessment  Assessment details: Treatment session initiated with MH/ESTIM to assist with pain control.  There ex focused on ROM, postural awareness, and gentle UE strengthening.  Patient educated to perform exercises per her tolerance, with patient verbalizing understanding.  Session concluded with cryotherapy, with no skin irritation following.  She noted a decrease in pain to 3/10 post-tx.  She will continue to be progressed per her tolerance and POC.          Timed:         Manual Therapy:         mins  81851;     Therapeutic Exercise:    26     mins  46168;     Neuromuscular Amena:        mins  10522;    Therapeutic Activity:          mins  29773;     Gait Training:           mins  09739;     Ultrasound:          mins  12214;    Ionto                                   mins   69888  Self Care                            mins   36666      Un-Timed:  Electrical Stimulation:    10     mins  62780 ( );  Dry Needling          mins self-pay  Traction          mins 60370  Canalith Repos         mins 82127  Ice-6 min    Timed Treatment:   36   mins   Total Treatment:     42   mins    Urszula Tavarez PT  [place] License: [id number]  Electronically signed by Urszula Tavarez PT, 25, 5:06 PM EDT.

## 2025-07-03 ENCOUNTER — OFFICE VISIT (OUTPATIENT)
Dept: NEUROSURGERY | Facility: CLINIC | Age: 65
End: 2025-07-03
Payer: MEDICARE

## 2025-07-03 ENCOUNTER — HOSPITAL ENCOUNTER (OUTPATIENT)
Dept: PHYSICAL THERAPY | Facility: HOSPITAL | Age: 65
Setting detail: THERAPIES SERIES
Discharge: HOME | End: 2025-07-03
Payer: MEDICARE

## 2025-07-03 VITALS — WEIGHT: 146.8 LBS | BODY MASS INDEX: 27.02 KG/M2 | TEMPERATURE: 98 F | HEIGHT: 62 IN

## 2025-07-03 DIAGNOSIS — M53.82 DECREASED ROM OF INTERVERTEBRAL DISCS OF CERVICAL SPINE: ICD-10-CM

## 2025-07-03 DIAGNOSIS — Z98.1 S/P CERVICAL SPINAL FUSION: Primary | ICD-10-CM

## 2025-07-03 PROCEDURE — 97110 THERAPEUTIC EXERCISES: CPT | Performed by: PHYSICAL THERAPIST

## 2025-07-03 PROCEDURE — G0283 ELEC STIM OTHER THAN WOUND: HCPCS | Performed by: PHYSICAL THERAPIST

## 2025-07-03 RX ORDER — HYDROCODONE BITARTRATE AND ACETAMINOPHEN 5; 325 MG/1; MG/1
1 TABLET ORAL
COMMUNITY
Start: 2025-06-06

## 2025-07-03 RX ORDER — METHOCARBAMOL 500 MG/1
500 TABLET, FILM COATED ORAL 3 TIMES DAILY
COMMUNITY
Start: 2025-06-06

## 2025-07-03 RX ORDER — FEZOLINETANT 45 MG/1
1 TABLET, FILM COATED ORAL DAILY
COMMUNITY
Start: 2025-06-06

## 2025-07-03 NOTE — PROGRESS NOTES
Physical Therapy Daily Treatment Note      Patient: Mandi Baldwin   : 1960  Referring practitioner: Yudith Leon PA-C  Date of Initial Visit: Type: THERAPY  Episode: Cervical spinal fusion  Today's Date: 7/3/2025  Patient seen for 3 sessions       Visit Diagnoses:    ICD-10-CM ICD-9-CM   1. S/P cervical spinal fusion  Z98.1 V45.4   2. Decreased ROM of intervertebral discs of cervical spine  M53.82 724.9       Subjective Evaluation    History of Present Illness    Subjective comment: Patient reports 3/10 pain today.       Objective   See Exercise, Manual, and Modality Logs for complete treatment.       Assessment & Plan       Assessment  Assessment details: Therapy session initiated with there ex, per flow sheet.  There ex progressed to include increased repetitions and the addition of cervical isometrics.  Patient provided with verbal and visual cues to ensure proper form with exercises.  Patient noted muscle fatigue and muscle soreness and conclusion of there ex, but denied an increase in pain.  Session concluded with ice/ESTIM to assist with pain control; no skin irritation was noted following modalities.  She reported 2/10 pain psot-tx.  Patient will continue to be progressed per her tolerance and POC.          Timed:         Manual Therapy:         mins  76023;     Therapeutic Exercise:    27     mins  92563;     Neuromuscular Amena:        mins  58448;    Therapeutic Activity:          mins  41714;     Gait Training:           mins  78290;     Ultrasound:          mins  71274;    Ionto                                   mins   61377  Self Care                            mins   55984      Un-Timed:  Electrical Stimulation:    10     mins  20593 ( );  Dry Needling          mins self-pay  Traction          mins 51752  Canalith Repos         mins 46885    Timed Treatment:   27   mins   Total Treatment:     37   mins    Urszula Tavarez, PT  [place] License: [id number]  Electronically signed by Urszula  TAWANNA Tavarez, PT, 07/03/25, 11:49 AM EDT.

## 2025-07-03 NOTE — PROGRESS NOTES
Mandi Baldwin  1960 07/03/2025  [id number]    CC: s/p C6-7 ACDF    HPI: Mandi Baldwin is a 65 y.o. female who is 2 mo s/p C6-7 ACDF.  Postoperatively, patient reports significant Promin in her left arm symptoms and she is overall pleased with her progress.  She continues to have a mild amount of numbness in the third digit of her right hand though does state that she thinks it could be related to her carpal tunnel.  Her swallowing is improved.  Incision is healing well.  She is been working with physical therapy which she thinks is helping with muscle tightness.      Past Medical History:   Diagnosis Date    Bulging of cervical intervertebral disc     Cervical disc disorder     COPD (chronic obstructive pulmonary disease)     Emphysema of lung     Glaucoma     Hypertension     Urinary tract infection        Allergies   Allergen Reactions    Sulfa Antibiotics Anaphylaxis    Penicillins Rash         Current Outpatient Medications:     albuterol (PROVENTIL HFA;VENTOLIN HFA) 108 (90 Base) MCG/ACT inhaler, Inhale 2 puffs Every 4 (Four) Hours As Needed for Wheezing., Disp: 1 inhaler, Rfl: 0    bisoprolol-hydrochlorothiazide (ZIAC) 5-6.25 MG per tablet, Take 1 tablet by mouth Daily., Disp: , Rfl:     budesonide-formoterol (SYMBICORT) 160-4.5 MCG/ACT inhaler, Inhale 2 puffs 2 (Two) Times a Day., Disp: 1 inhaler, Rfl: 0    buPROPion XL (WELLBUTRIN XL) 300 MG 24 hr tablet, Take 1 tablet by mouth Daily., Disp: , Rfl:     diazePAM (VALIUM) 5 MG tablet, Take 1 tablet by mouth Every 8 (Eight) Hours As Needed for Muscle Spasms., Disp: 20 tablet, Rfl: 0    Fezolinetant (Veozah) 45 MG tablet, Take 1 tablet by mouth Daily., Disp: , Rfl:     hydrALAZINE (APRESOLINE) 25 MG tablet, Take 1 tablet by mouth 3 (Three) Times a Day., Disp: , Rfl:     HYDROcodone-acetaminophen (NORCO) 5-325 MG per tablet, Take 1 tablet by mouth., Disp: , Rfl:     methocarbamol (ROBAXIN) 500 MG tablet, Take 1 tablet by mouth 3 (Three) Times a Day.,  Disp: , Rfl:     naloxone (NARCAN) 4 MG/0.1ML nasal spray, Call 911. Don't prime. [place] in 1 nostril for overdose. Repeat in 2-3 minutes in other nostril if no or minimal breathing/responsiveness., Disp: 2 each, Rfl: 0    ondansetron (ZOFRAN) 4 MG tablet, Take 1 tablet by mouth Every 8 (Eight) Hours As Needed. for nausea, Disp: , Rfl:     Timolol Maleate, Once-Daily, 0.5 % solution, INSTILL 1 DROP INTO BOTH EYES EVERY MORNING, Disp: , Rfl:     Review of Systems   Constitutional:  Negative for activity change, appetite change, chills, diaphoresis, fatigue, fever and unexpected weight change.   HENT:  Negative for congestion, dental problem, drooling, ear discharge, ear pain, facial swelling, hearing loss, mouth sores, nosebleeds, postnasal drip, rhinorrhea, sinus pressure, sinus pain, sneezing, sore throat, tinnitus, trouble swallowing and voice change.    Eyes:  Negative for photophobia, pain, discharge, redness, itching and visual disturbance.   Respiratory:  Negative for apnea, cough, choking, chest tightness, shortness of breath, wheezing and stridor.    Cardiovascular:  Negative for chest pain, palpitations and leg swelling.   Gastrointestinal:  Negative for abdominal distention, abdominal pain, anal bleeding, blood in stool, constipation, diarrhea, nausea, rectal pain and vomiting.   Endocrine: Negative for cold intolerance, heat intolerance, polydipsia, polyphagia and polyuria.   Genitourinary:  Negative for decreased urine volume, difficulty urinating, dysuria, enuresis, flank pain, frequency, genital sores, hematuria and urgency.   Musculoskeletal:  Negative for arthralgias, back pain, gait problem, joint swelling, myalgias, neck pain and neck stiffness.   Skin:  Negative for color change, pallor, rash and wound.   Allergic/Immunologic: Negative for environmental allergies, food allergies and immunocompromised state.   Neurological:  Negative for dizziness, tremors, seizures, syncope, facial asymmetry,  "speech difficulty, weakness, light-headedness, numbness and headaches.   Hematological:  Negative for adenopathy. Does not bruise/bleed easily.   Psychiatric/Behavioral:  Negative for agitation, behavioral problems, confusion, decreased concentration, dysphoric mood, hallucinations, self-injury, sleep disturbance and suicidal ideas. The patient is not nervous/anxious and is not hyperactive.          PE:  Temp 98 °F (36.7 °C) (Temporal)   Ht 157.5 cm (62\")   Wt 66.6 kg (146 lb 12.8 oz)   BMI 26.85 kg/m²     Neurological Exam    Awake, alert and oriented x 3  Opens eyes spont  Pupils 3 mm rx bilat  Extraocular muscles intact bilaterally  Face symmetric bilaterally  Incision healed  5/5 in all 4 ext      Social History    Tobacco Use      Smoking status: Every Day        Packs/day: 0.50        Years: 0.5 packs/day for 47.5 years (23.8 ttl pk-yrs)        Types: Cigarettes        Start date: 1978        Passive exposure: Current      Smokeless tobacco: Never       Tobacco Use: High Risk (7/3/2025)    Patient History     Smoking Tobacco Use: Every Day     Smokeless Tobacco Use: Never     Passive Exposure: Current         STEADI Fall Risk Assessment was completed, and patient is at HIGH risk for falls. Assessment completed on:6/5/2025      Diagnoses and all orders for this visit:    1. S/P cervical spinal fusion (Primary)       Assessment/Plan  This is a 65 y.o. female who is 2 months status post C6-7 ACDF.  Postoperatively, patient reports significant improvement in her upper extremity symptoms and she is overall pleased with her progress.  Incision is healed.  X-rays show stable hardware placement.  Recommend continuing physical therapy.  Since she is doing so well, I do not think she needs a formal follow-up.  She can call the office with any new or worsening issues.  If her carpal tunnel symptoms were to worsen, she can also call our office and I would be be happy to reevaluate. Patient encouraged to contact us if " she has any changes in her condition or any concerns.    Any copied data from previous notes included in the (1) HPI, (2) PE, (3) MDM and/or Assessment and Plan has been reviewed and is accurate as of 07/03/25    Yudith Leon PA-C  07/03/25     Patent

## 2025-07-08 ENCOUNTER — APPOINTMENT (OUTPATIENT)
Dept: PHYSICAL THERAPY | Facility: HOSPITAL | Age: 65
End: 2025-07-08
Payer: MEDICARE

## 2025-07-10 ENCOUNTER — APPOINTMENT (OUTPATIENT)
Dept: PHYSICAL THERAPY | Facility: HOSPITAL | Age: 65
End: 2025-07-10
Payer: MEDICARE

## 2025-07-15 ENCOUNTER — HOSPITAL ENCOUNTER (OUTPATIENT)
Dept: PHYSICAL THERAPY | Facility: HOSPITAL | Age: 65
Setting detail: THERAPIES SERIES
Discharge: HOME | End: 2025-07-15
Payer: MEDICARE

## 2025-07-15 DIAGNOSIS — M53.82 DECREASED ROM OF INTERVERTEBRAL DISCS OF CERVICAL SPINE: ICD-10-CM

## 2025-07-15 DIAGNOSIS — Z98.1 S/P CERVICAL SPINAL FUSION: Primary | ICD-10-CM

## 2025-07-15 PROCEDURE — 97140 MANUAL THERAPY 1/> REGIONS: CPT | Performed by: PHYSICAL THERAPIST

## 2025-07-15 PROCEDURE — 97110 THERAPEUTIC EXERCISES: CPT | Performed by: PHYSICAL THERAPIST

## 2025-07-15 PROCEDURE — G0283 ELEC STIM OTHER THAN WOUND: HCPCS | Performed by: PHYSICAL THERAPIST

## 2025-07-15 NOTE — PROGRESS NOTES
"Physical Therapy Daily Treatment Note      Patient: Mandi Baldwin   : 1960  Referring practitioner: Yudith Leon PA-C  Date of Initial Visit: Type: THERAPY  Episode: Cervical spinal fusion  Today's Date: 7/15/2025  Patient seen for 4 sessions       Visit Diagnoses:    ICD-10-CM ICD-9-CM   1. S/P cervical spinal fusion  Z98.1 V45.4   2. Decreased ROM of intervertebral discs of cervical spine  M53.82 724.9       Subjective Evaluation    History of Present Illness    Subjective comment: Patient reports that her neck feels \"tight\" today.       Objective   See Exercise, Manual, and Modality Logs for complete treatment.       Assessment & Plan       Assessment  Assessment details: Patient tolerated today's session well, noting a decrease in pain post-tx.  Treatment initiated with there ex, followed by manual therapy, ice/ESTIM.  There ex progressed to include the addition of lawnmowers and sternal lifts.  STM performed at cervical musculature and bilateral UTs, with trigger points noted at UT bilaterally.  Patient will continue to be progressed per her tolerance and POC.          Timed:         Manual Therapy:    15     mins  10228;     Therapeutic Exercise:    27     mins  87540;     Neuromuscular Amena:        mins  57578;    Therapeutic Activity:          mins  84912;     Gait Training:           mins  60379;     Ultrasound:          mins  73185;    Ionto                                   mins   36662  Self Care                            mins   77412      Un-Timed:  Electrical Stimulation:     10    mins  15096 ( );  Dry Needling          mins self-pay  Traction          mins [id number]  Canalith Repos         mins [id number]    Timed Treatment:   42   mins   Total Treatment:     52   mins    Urszula Tavarez PT  [place] License: [id number]  Electronically signed by Urszula Tavarez PT, 07/15/25, 2:03 PM EDT.                      "

## 2025-07-22 ENCOUNTER — APPOINTMENT (OUTPATIENT)
Dept: PHYSICAL THERAPY | Facility: HOSPITAL | Age: 65
End: 2025-07-22
Payer: MEDICARE

## 2025-07-24 ENCOUNTER — APPOINTMENT (OUTPATIENT)
Dept: PHYSICAL THERAPY | Facility: HOSPITAL | Age: 65
End: 2025-07-24
Payer: MEDICARE

## (undated) DEVICE — ANTIBACTERIAL UNDYED BRAIDED (POLYGLACTIN 910), SYNTHETIC ABSORBABLE SUTURE: Brand: COATED VICRYL

## (undated) DEVICE — DRSNG WND BORDR/ADHS NONADHR/GZ LF 4X4IN STRL

## (undated) DEVICE — GLV SURG PREMIERPRO MIC LTX PF SZ7.5 BRN

## (undated) DEVICE — SHEET, DRAPE, SPLIT, STERILE: Brand: MEDLINE

## (undated) DEVICE — DRP MICROSCOPE 4 BINOCULAR CV 54X150IN

## (undated) DEVICE — PATIENT RETURN ELECTRODE, SINGLE-USE, CONTACT QUALITY MONITORING, ADULT, WITH 9FT CORD, FOR PATIENTS WEIGING OVER 33LBS. (15KG): Brand: MEGADYNE

## (undated) DEVICE — SPONGE,DISSECTOR,K,XRAY,9/16"X1/4",STRL: Brand: MEDLINE

## (undated) DEVICE — DRILL BIT 7080513 STERILE 13MM

## (undated) DEVICE — CONN TBG Y 5 IN 1 LF STRL

## (undated) DEVICE — GLV SURG PREMIERPRO GAMMEX NEOPRN PF SZ7.5 GRN

## (undated) DEVICE — SYR CONTRL LUERLOK 10CC

## (undated) DEVICE — SUT SILK 2/0 TIES 18IN A185H

## (undated) DEVICE — BANDAGE,GAUZE,BULKEE II,4.5"X4.1YD,STRL: Brand: MEDLINE

## (undated) DEVICE — DRAPE,INSTRUMENT,MAGNETIC,10X16: Brand: MEDLINE

## (undated) DEVICE — PK NEURO DISC 10

## (undated) DEVICE — GLV SURG PREMIERPRO MIC LTX PF SZ6 BRN

## (undated) DEVICE — SNAP KOVER: Brand: UNBRANDED

## (undated) DEVICE — BLANKT WARM LOWR/BDY 100X120CM

## (undated) DEVICE — DISPOSABLE BIPOLAR FORCEPS 7 3/4" (19.7CM) SCOVILLE BAYONET, INSULATED, 1.5MM TIP AND 12 FT. (3.6M) CABLE: Brand: KIRWAN

## (undated) DEVICE — NEEDLE, QUINCKE, 20GX3.5": Brand: MEDLINE

## (undated) DEVICE — INSTRUMENT 875-088 14MM DSTRCT PIN STRL: Brand: MEDTRONIC REUSABLE INSTRUMENT

## (undated) DEVICE — ELECTRD BLD EZ CLN MOD XLNG 2.75IN

## (undated) DEVICE — GLV SURG DERMASSURE GRN LF PF SZ 6.5

## (undated) DEVICE — MICRO HVTSA, 0.5G AND HVTSA SOURCEMARK PRODUCT CODE M1206 AND M1206-01: Brand: EXOFIN MICRO HVTSA, 0.5G

## (undated) DEVICE — SURGUARD3 SAFETY HYPODERMIC NEEDLE: Brand: SURGUARD3

## (undated) DEVICE — STRAP POSTN KN/BDY FM 5X72IN DISP

## (undated) DEVICE — APPL CHLORAPREP TINTED 26ML TEAL

## (undated) DEVICE — NEURO SPONGES: Brand: DEROYAL

## (undated) DEVICE — TOOL MR8-14MH30T MR8 14CM MH SYMTRI 3MM: Brand: MIDAS REX MR8